# Patient Record
Sex: MALE | Race: WHITE | NOT HISPANIC OR LATINO | Employment: FULL TIME | URBAN - METROPOLITAN AREA
[De-identification: names, ages, dates, MRNs, and addresses within clinical notes are randomized per-mention and may not be internally consistent; named-entity substitution may affect disease eponyms.]

---

## 2020-07-25 ENCOUNTER — APPOINTMENT (OUTPATIENT)
Dept: RADIOLOGY | Facility: MEDICAL CENTER | Age: 55
End: 2020-07-25
Attending: EMERGENCY MEDICINE
Payer: COMMERCIAL

## 2020-07-25 ENCOUNTER — TELEPHONE (OUTPATIENT)
Dept: CARDIOLOGY | Facility: MEDICAL CENTER | Age: 55
End: 2020-07-25

## 2020-07-25 ENCOUNTER — HOSPITAL ENCOUNTER (EMERGENCY)
Facility: MEDICAL CENTER | Age: 55
End: 2020-07-25
Attending: EMERGENCY MEDICINE
Payer: COMMERCIAL

## 2020-07-25 VITALS
SYSTOLIC BLOOD PRESSURE: 125 MMHG | BODY MASS INDEX: 24.44 KG/M2 | HEART RATE: 53 BPM | DIASTOLIC BLOOD PRESSURE: 84 MMHG | RESPIRATION RATE: 18 BRPM | HEIGHT: 69 IN | WEIGHT: 165 LBS | OXYGEN SATURATION: 96 % | TEMPERATURE: 98 F

## 2020-07-25 DIAGNOSIS — I48.0 PAF (PAROXYSMAL ATRIAL FIBRILLATION) (HCC): Chronic | ICD-10-CM

## 2020-07-25 DIAGNOSIS — I48.0 PAROXYSMAL ATRIAL FIBRILLATION (HCC): ICD-10-CM

## 2020-07-25 LAB
ALBUMIN SERPL BCP-MCNC: 3.9 G/DL (ref 3.2–4.9)
ALBUMIN/GLOB SERPL: 1.8 G/DL
ALP SERPL-CCNC: 57 U/L (ref 30–99)
ALT SERPL-CCNC: 17 U/L (ref 2–50)
ANION GAP SERPL CALC-SCNC: 13 MMOL/L (ref 7–16)
AST SERPL-CCNC: 21 U/L (ref 12–45)
BASOPHILS # BLD AUTO: 0.7 % (ref 0–1.8)
BASOPHILS # BLD: 0.04 K/UL (ref 0–0.12)
BILIRUB SERPL-MCNC: 0.7 MG/DL (ref 0.1–1.5)
BUN SERPL-MCNC: 13 MG/DL (ref 8–22)
CALCIUM SERPL-MCNC: 9 MG/DL (ref 8.5–10.5)
CHLORIDE SERPL-SCNC: 104 MMOL/L (ref 96–112)
CO2 SERPL-SCNC: 21 MMOL/L (ref 20–33)
CREAT SERPL-MCNC: 1.1 MG/DL (ref 0.5–1.4)
D DIMER PPP IA.FEU-MCNC: <0.27 UG/ML (FEU) (ref 0–0.5)
EKG IMPRESSION: NORMAL
EKG IMPRESSION: NORMAL
EOSINOPHIL # BLD AUTO: 0.04 K/UL (ref 0–0.51)
EOSINOPHIL NFR BLD: 0.7 % (ref 0–6.9)
ERYTHROCYTE [DISTWIDTH] IN BLOOD BY AUTOMATED COUNT: 43.2 FL (ref 35.9–50)
GLOBULIN SER CALC-MCNC: 2.2 G/DL (ref 1.9–3.5)
GLUCOSE SERPL-MCNC: 67 MG/DL (ref 65–99)
HCT VFR BLD AUTO: 45.9 % (ref 42–52)
HGB BLD-MCNC: 15.1 G/DL (ref 14–18)
IMM GRANULOCYTES # BLD AUTO: 0.01 K/UL (ref 0–0.11)
IMM GRANULOCYTES NFR BLD AUTO: 0.2 % (ref 0–0.9)
LYMPHOCYTES # BLD AUTO: 1.59 K/UL (ref 1–4.8)
LYMPHOCYTES NFR BLD: 26.9 % (ref 22–41)
MAGNESIUM SERPL-MCNC: 1.8 MG/DL (ref 1.5–2.5)
MCH RBC QN AUTO: 31.6 PG (ref 27–33)
MCHC RBC AUTO-ENTMCNC: 32.9 G/DL (ref 33.7–35.3)
MCV RBC AUTO: 96 FL (ref 81.4–97.8)
MONOCYTES # BLD AUTO: 0.56 K/UL (ref 0–0.85)
MONOCYTES NFR BLD AUTO: 9.5 % (ref 0–13.4)
NEUTROPHILS # BLD AUTO: 3.67 K/UL (ref 1.82–7.42)
NEUTROPHILS NFR BLD: 62 % (ref 44–72)
NRBC # BLD AUTO: 0 K/UL
NRBC BLD-RTO: 0 /100 WBC
PLATELET # BLD AUTO: 216 K/UL (ref 164–446)
PMV BLD AUTO: 10.2 FL (ref 9–12.9)
POTASSIUM SERPL-SCNC: 3.9 MMOL/L (ref 3.6–5.5)
PROT SERPL-MCNC: 6.1 G/DL (ref 6–8.2)
RBC # BLD AUTO: 4.78 M/UL (ref 4.7–6.1)
SODIUM SERPL-SCNC: 138 MMOL/L (ref 135–145)
TROPONIN T SERPL-MCNC: 20 NG/L (ref 6–19)
WBC # BLD AUTO: 5.9 K/UL (ref 4.8–10.8)

## 2020-07-25 PROCEDURE — 80053 COMPREHEN METABOLIC PANEL: CPT

## 2020-07-25 PROCEDURE — 93005 ELECTROCARDIOGRAM TRACING: CPT | Performed by: EMERGENCY MEDICINE

## 2020-07-25 PROCEDURE — 85025 COMPLETE CBC W/AUTO DIFF WBC: CPT

## 2020-07-25 PROCEDURE — 83735 ASSAY OF MAGNESIUM: CPT

## 2020-07-25 PROCEDURE — 85379 FIBRIN DEGRADATION QUANT: CPT

## 2020-07-25 PROCEDURE — 93005 ELECTROCARDIOGRAM TRACING: CPT

## 2020-07-25 PROCEDURE — 71045 X-RAY EXAM CHEST 1 VIEW: CPT

## 2020-07-25 PROCEDURE — 99284 EMERGENCY DEPT VISIT MOD MDM: CPT | Performed by: INTERNAL MEDICINE

## 2020-07-25 PROCEDURE — 84484 ASSAY OF TROPONIN QUANT: CPT

## 2020-07-25 PROCEDURE — 99284 EMERGENCY DEPT VISIT MOD MDM: CPT

## 2020-07-25 RX ORDER — PROPAFENONE HYDROCHLORIDE 150 MG/1
150 TABLET, COATED ORAL ONCE
Status: DISCONTINUED | OUTPATIENT
Start: 2020-07-25 | End: 2020-07-25 | Stop reason: HOSPADM

## 2020-07-25 ASSESSMENT — LIFESTYLE VARIABLES
EVER HAD A DRINK FIRST THING IN THE MORNING TO STEADY YOUR NERVES TO GET RID OF A HANGOVER: NO
TOTAL SCORE: 0
DO YOU DRINK ALCOHOL: NO
ON A TYPICAL DAY WHEN YOU DRINK ALCOHOL HOW MANY DRINKS DO YOU HAVE: 0
AVERAGE NUMBER OF DAYS PER WEEK YOU HAVE A DRINK CONTAINING ALCOHOL: 0
HAVE PEOPLE ANNOYED YOU BY CRITICIZING YOUR DRINKING: NO
CONSUMPTION TOTAL: NEGATIVE
TOTAL SCORE: 0
EVER FELT BAD OR GUILTY ABOUT YOUR DRINKING: NO
HAVE YOU EVER FELT YOU SHOULD CUT DOWN ON YOUR DRINKING: NO
TOTAL SCORE: 0
HOW MANY TIMES IN THE PAST YEAR HAVE YOU HAD 5 OR MORE DRINKS IN A DAY: 0

## 2020-07-25 NOTE — ED NOTES
"Patient states that his \"chest feel different and does not have the feeling he had before\". Patient on monitor and HR in the 50's. ON monitor and ERP updated on patient/   "

## 2020-07-25 NOTE — ED PROVIDER NOTES
ED Provider Note    ED Provider Note    Primary care provider: Caitlin Sargent  Means of arrival: EMS  History obtained from: Patient    CHIEF COMPLAINT  Chief Complaint   Patient presents with   • Abnormal EKG     Seen at 12:47 PM.   HPI  Jose Manuel Aguirre is a 55 y.o. male who presents to the Emergency Department chest pain and shortness of breath.  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  He has been at work for the past 6 hours, initially he felt well but over the past few hours he noted increasing shortness of breath and a rapid heart rate.  He was unable to control the heart rate, he also began developing some left-sided chest discomfort and thus is transported in by EMS.  He denies any prior history of a tachyarrhythmia.  He does smoke cigarettes.  He does not take any medications.  Many years ago he had an episode of chest pain, he was evaluated and found to have no significant abnormalities.  He underwent a stress test at that time that was normal.    Prior to the events occurring today he was in his usual state of health.  He denies any recent fevers, chills, cough, numbness, weakness or bleeding diathesis.  No abdominal pain or back pain.    REVIEW OF SYSTEMS  See HPI,   Remainder of ROS negative.     PAST MEDICAL HISTORY   has a past medical history of Kidney stones and PAF (paroxysmal atrial fibrillation) (HCC).    SURGICAL HISTORY   has a past surgical history that includes other abdominal surgery and lap,surg,colectomy, partial, w/anast (1996).    SOCIAL HISTORY  Social History     Tobacco Use   • Smoking status: Former Smoker     Packs/day: 0.50     Years: 30.00     Pack years: 15.00     Types: Cigarettes   Substance Use Topics   • Alcohol use: No   • Drug use: No     Comment: cocaine, clean since 2001      Social History     Substance and Sexual Activity   Drug Use No    Comment: cocaine, clean since 2001       FAMILY HISTORY  No family history  "on file.    CURRENT MEDICATIONS  Reviewed.  See Encounter Summary.     ALLERGIES  No Known Allergies    PHYSICAL EXAM  VITAL SIGNS: /84   Pulse (!) 53   Temp 36.7 °C (98 °F) (Temporal)   Resp 18   Ht 1.753 m (5' 9\")   Wt 74.8 kg (165 lb)   SpO2 96%   BMI 24.37 kg/m²   Constitutional: Awake, alert in no apparent distress.  HENT: Normocephalic, Bilateral external ears normal. Nose normal.   Eyes: Conjunctiva normal, non-icteric, EOMI.    Thorax & Lungs: Easy unlabored respirations, Clear to ascultation bilaterally.  Cardiovascular: Regular rate, Regular rhythm, No murmurs, rubs or gallops. Bilateral pulses symmetrical.   Abdomen:  Soft, nontender, nondistended, normal active bowel sounds.   :    Skin: Visualized skin is  Dry, No erythema, No rash.   Musculoskeletal:   No cyanosis, clubbing or edema. No leg asymmetry.   Neurologic: Alert, Grossly non-focal.   Psychiatric: Normal affect, Normal mood  Lymphatic:  No cervical LAD    EKG   12 lead Interpreted by me  Rhythm: Narrow complex tachycardia  Rate: 150  Axis: normal  Ectopy: none  Conduction: A. fib  ST Segments: no acute change  T Waves: no acute change  Clinical Impression: A. fib with RVR    EKG   12 lead Interpreted by me  Rhythm: Sinus bradycardia  Rate: 57  Axis: normal  Ectopy: none  Conduction: normal  ST Segments: no acute change  T Waves: no acute change  Clinical Impression: Sinus bradycardia, flat ST segments throughout.    RADIOLOGY  DX-CHEST-PORTABLE (1 VIEW)   Final Result      1.  There is mild bronchial wall thickening in central interstitial opacity which could be present mild changes of edema.   2.  Cardiac silhouette is normal in size.            COURSE & MEDICAL DECISION MAKING  Pertinent Labs & Imaging studies reviewed. (See chart for details)    Differential diagnoses include but are not limited to: Pulmonary embolus, atypical chest pain, paroxysmal atrial fibrillation, ACS    12:47 PM - Medical record reviewed, patient seen " and examined at bedside.    1:27 PM-during the patient interview the patient had multiple runs of A. fib with return to a sinus bradycardia.  Borderline systolic blood pressure.  Cardiology paged for consultation.  D-dimer added for possibility of pulmonary embolus.    2:29 PM-case discussed with Dr. Mayfield, cardiology.  The patient has been evaluated.  The patient declines antiarrhythmics and anticoagulation at this time.  We discussed the rationale behind treatment and the patient continues to decline any medications.    Decision Making:  This is a 55 y.o. year old male who presents with paroxysmal atrial fibrillation with associated chest pain.  The patient was evaluated in the emergency department.  I recommended starting anticoagulation and an antiarrhythmic.  I discussed the case with cardiology who also evaluated the patient and recommended starting a anticoagulant and antiarrhythmic.  The patient apparently feels that this is not logical.  He also states that his brother had a stroke after discontinuing the anticoagulation, therefore he does not want to start the medication.    He was informed the risk of 5% of developing a CVA per year of untreated atrial fibrillation.  The patient cannot quite comprehend this but he is willing to follow-up.  With regards to chest pain standpoint, troponin is at 20 which is borderline elevation.  Cardiology did not feel any indication to repeat this.  The patient is not having active chest pain at this time.  He was given referrals for cardiology and specifically the electrophysiologist for follow-up.  The patient is willing to follow-up and was discharged at this time.  Of note because of his new onset atrial fibrillation I did order a d-dimer, this is negative, therefore the cause is not related to acute pulmonary embolism.  Heart score is moderate.    Discharge Medications:  There are no discharge medications for this patient.      The patient was discharged home (see  d/c instructions) was told to return immediately for any signs or symptoms listed, or any worsening at all.  The patient verbally agreed to the discharge precautions and follow-up plan which is documented in EPIC.    The patient's blood pressure is elevated today. >120/80. I have referred them to primary care for follow up.       FINAL IMPRESSION  1. Paroxysmal atrial fibrillation (HCC)

## 2020-07-25 NOTE — CONSULTS
Reason for Consult:  Asked by Dr Lance Bose M.D. to see this patient with paroxysmal atrial fibrillation  Patient's PCP: Caitlin Sargent    CC:   Chief Complaint   Patient presents with   • Abnormal EKG       HPI: This is a 55-year-old gentleman with a history of chest discomfort with normal ischemic work-up in the remote past as well as little bit more recently presents with palpitations chest discomfort sense of racing heart was found to have atrial fibrillation that was coming and going but quite fast in the ER over time his heart rate settled out.  He does not have any known trigger he is not having toxic habits.  He believes his current symptoms may have been similar to what his symptoms are back in 2012 which prompted heart evaluation then.  His brother had a stroke off of his blood thinners    Medications / Drug list prior to admission:  No current facility-administered medications on file prior to encounter.      No current outpatient medications on file prior to encounter.       Current list of administered Medications:    Current Facility-Administered Medications:   •  propafenone (RYTHMOL) tablet 150 mg, 150 mg, Oral, Once, Lance Bose M.D.  No current outpatient medications on file.    Past Medical History:   Diagnosis Date   • Kidney stones    • PAF (paroxysmal atrial fibrillation) (HCC)        Past Surgical History:   Procedure Laterality Date   • PB LAP,SURG,COLECTOMY, PARTIAL, W/ANAST  1996    d/t colitis   • OTHER ABDOMINAL SURGERY         Family History   Problem Relation Age of Onset   • Stroke Brother      Patient family history was personally reviewed, no pertinent family history to current presentation    Social History     Tobacco Use   • Smoking status: Former Smoker     Packs/day: 0.50     Years: 30.00     Pack years: 15.00     Types: Cigarettes   Substance Use Topics   • Alcohol use: No   • Drug use: No     Comment: cocaine, clean since 2001       ALLERGIES:  No Known  "Allergies    Review of systems:  A complete review of symptoms was reviewed with patient . This is reviewed in H&P and PMH. ALL OTHERS reviewed and negative    Physical exam:  Patient Vitals for the past 24 hrs:   BP Temp Temp src Pulse Resp SpO2 Height Weight   07/25/20 1421 117/82 -- -- (!) 54 16 96 % -- --   07/25/20 1351 -- -- -- (!) 50 -- 99 % -- --   07/25/20 1321 101/56 -- -- (!) 156 16 98 % -- --   07/25/20 1312 (!) 98/64 -- -- (!) 136 16 98 % -- --   07/25/20 1302 103/62 -- -- (!) 146 18 99 % -- --   07/25/20 1251 109/86 -- -- (!) 149 16 99 % -- --   07/25/20 1238 (!) 99/63 -- -- (!) 52 15 96 % -- --   07/25/20 1236 146/83 36.7 °C (98 °F) Temporal (!) 148 15 97 % -- --   07/25/20 1230 -- -- -- -- -- -- 1.753 m (5' 9\") 74.8 kg (165 lb)     General: No acute distress.   EYES: no jaundice  HEENT: OP clear   Neck: No bruits No JVD.   CVS: Bradycardic. S1 + S2. No M/R/G  Resp: CTAB. No wheezing or crackles/rhonchi.  Abdomen: Soft, NT, ND,  Skin: Grossly nothing acute no obvious rashes  Neurological: Alert, Moves all extremities, no cranial nerve defects on limited exam  Extremities: Pulse 2+ in b/l LE.  No edema. No cyanosis.       Data:  Laboratory studies personally reviewed by me:  Recent Results (from the past 24 hour(s))   CBC w/ Differential    Collection Time: 07/25/20 12:30 PM   Result Value Ref Range    WBC 5.9 4.8 - 10.8 K/uL    RBC 4.78 4.70 - 6.10 M/uL    Hemoglobin 15.1 14.0 - 18.0 g/dL    Hematocrit 45.9 42.0 - 52.0 %    MCV 96.0 81.4 - 97.8 fL    MCH 31.6 27.0 - 33.0 pg    MCHC 32.9 (L) 33.7 - 35.3 g/dL    RDW 43.2 35.9 - 50.0 fL    Platelet Count 216 164 - 446 K/uL    MPV 10.2 9.0 - 12.9 fL    Neutrophils-Polys 62.00 44.00 - 72.00 %    Lymphocytes 26.90 22.00 - 41.00 %    Monocytes 9.50 0.00 - 13.40 %    Eosinophils 0.70 0.00 - 6.90 %    Basophils 0.70 0.00 - 1.80 %    Immature Granulocytes 0.20 0.00 - 0.90 %    Nucleated RBC 0.00 /100 WBC    Neutrophils (Absolute) 3.67 1.82 - 7.42 K/uL    Lymphs " (Absolute) 1.59 1.00 - 4.80 K/uL    Monos (Absolute) 0.56 0.00 - 0.85 K/uL    Eos (Absolute) 0.04 0.00 - 0.51 K/uL    Baso (Absolute) 0.04 0.00 - 0.12 K/uL    Immature Granulocytes (abs) 0.01 0.00 - 0.11 K/uL    NRBC (Absolute) 0.00 K/uL   Complete Metabolic Panel (CMP)    Collection Time: 20 12:30 PM   Result Value Ref Range    Sodium 138 135 - 145 mmol/L    Potassium 3.9 3.6 - 5.5 mmol/L    Chloride 104 96 - 112 mmol/L    Co2 21 20 - 33 mmol/L    Anion Gap 13.0 7.0 - 16.0    Glucose 67 65 - 99 mg/dL    Bun 13 8 - 22 mg/dL    Creatinine 1.10 0.50 - 1.40 mg/dL    Calcium 9.0 8.5 - 10.5 mg/dL    AST(SGOT) 21 12 - 45 U/L    ALT(SGPT) 17 2 - 50 U/L    Alkaline Phosphatase 57 30 - 99 U/L    Total Bilirubin 0.7 0.1 - 1.5 mg/dL    Albumin 3.9 3.2 - 4.9 g/dL    Total Protein 6.1 6.0 - 8.2 g/dL    Globulin 2.2 1.9 - 3.5 g/dL    A-G Ratio 1.8 g/dL   Troponin STAT    Collection Time: 20 12:30 PM   Result Value Ref Range    Troponin T 20 (H) 6 - 19 ng/L   Magnesium    Collection Time: 20 12:30 PM   Result Value Ref Range    Magnesium 1.8 1.5 - 2.5 mg/dL   D-DIMER    Collection Time: 20 12:30 PM   Result Value Ref Range    D-Dimer Screen <0.27 0.00 - 0.50 ug/mL (FEU)   ESTIMATED GFR    Collection Time: 20 12:30 PM   Result Value Ref Range    GFR If African American >60 >60 mL/min/1.73 m 2    GFR If Non African American >60 >60 mL/min/1.73 m 2   EKG    Collection Time: 20 12:36 PM   Result Value Ref Range    Report       [facility] Emergency Dept.    Test Date:  2020  Pt Name:    CATHIE GODINEZ            Department: ER  MRN:        [id number]                      Room:       [facility]  Gender:     Male                         Technician: [id number]  :        1965                   Requested By:ER TRIAGE PROTOCOL  Order #:    [id number]                    Reading MD:    Measurements  Intervals                                Axis  Rate:       57                            P:          74  CO:         140                          QRS:        62  QRSD:       76                           T:          249  QT:         364  QTc:        355    Interpretive Statements  SINUS BRADYCARDIA  LEFT ATRIAL ABNORMALITY  RSR' IN V1 OR V2, PROBABLY NORMAL VARIANT  NONSPECIFIC REPOL ABNORMALITY, INFERIOR LEADS  BASELINE WANDER IN LEAD(S) V6  Compared to ECG 2012 18:16:33  Atrial abnormality now present  RSR' in V1 or V2 now present  Sinus rhythm no longer present     EKG    Collection Time: 20 12:38 PM   Result Value Ref Range    Report       [facility] Emergency Dept.    Test Date:  2020  Pt Name:    CATHIE GODINEZ            Department: ER  MRN:        [id number]                      Room:       [facility]  Gender:     Male                         Technician: [id number]  :        1965                   Requested By:ER TRIAGE PROTOCOL  Order #:    [id number]                    Reading MD:    Measurements  Intervals                                Axis  Rate:       150                          P:  CO:                                      QRS:        66  QRSD:       68                           T:          253  QT:         280  QTc:        443    Interpretive Statements  ATRIAL FIBRILLATION  ABNORMAL T, CONSIDER ISCHEMIA, DIFFUSE LEADS  Compared to ECG 2020 12:36:50  T-wave abnormality now present  Possible ischemia now present  Sinus bradycardia no longer present  Atrial abnormality no longer present  Early repolarization no longer present         Imaging:  DX-CHEST-PORTABLE (1 VIEW)   Final Result      1.  There is mild bronchial wall thickening in central interstitial opacity which could be present mild changes of edema.   2.  Cardiac silhouette is normal in size.              EKG : personally reviewed by stephany kelley telemetry now shows sinus bradycardia    Prior stress test was normal    All pertinent features of laboratory and imaging reviewed including  primary images where applicable        Assessment / Plan:  Paroxysmal atrial fibrillation we discussed the available treatment options he would be a good candidate for propafenone for suppressive therapy although given this was is first occurrence and was short-lived have a diagnosis for his symptoms does not absolutely necessary we also discussed given his minimal medical history he is at low risk for stroke but it is not unreasonable to take blood thinners in the first couple of months.  He understands risks and benefits of that conversation declines to take both we will arrange for an outpatient echocardiogram and an outpatient EP evaluation but he can be safely discharged from the emergency room was urged to call us should he have recurrence of symptoms        I personally discussed his case with  Dr Lance Bose M.D.    No future appointments.    It is my pleasure to participate in the care of Mr. Aguirre.  Please do not hesitate to contact me with questions or concerns.    Kishore Mayfield MD PhD [facility]  Cardiologist [facility]    7/25/2020    Please note that this dictation was created using voice recognition software. There may be errors I did not discover before finalizing the note.

## 2020-07-25 NOTE — ED TRIAGE NOTES
Pt presents via ems with irregular heart rhythm. Pt will go from bradycardia, tachycardia, svt, and fib intermittently. Pt bp increases and decreases with heart rate changes. Pt had diaphoresis and n/v when it first started. Pt is alert pwd, pads are on pt. Has significant family heart hx.   Past Medical History:   Diagnosis Date   • Kidney stones

## 2020-07-28 NOTE — TELEPHONE ENCOUNTER
Kishore Mayfield M.D.  You 5 days ago       Make sure he gets an echo and offered EP appt    Routing comment            [employer] message sent 07/28/20. Will attempt to call pt if message is not read by the end of the week.

## 2020-07-30 NOTE — TELEPHONE ENCOUNTER
Pt read [employer] message, it appears that pt has scheduled his echo for 8/10 and switched his appt w/ JS to  on 8/19.

## 2020-07-31 ENCOUNTER — TELEPHONE (OUTPATIENT)
Dept: CARDIOLOGY | Facility: MEDICAL CENTER | Age: 55
End: 2020-07-31

## 2020-07-31 NOTE — TELEPHONE ENCOUNTER
Mayra    Pt calling with questions, relative to your MyChart email of 7/28.   Pt wants to have a return to work release.    Employer H/R person told pt to call to obtain such letter.      Please either email it to him or put it in his MyChart and kindly leave a v/m as well.     Please call Jose Manuel, [phone]

## 2020-07-31 NOTE — TELEPHONE ENCOUNTER
Pt returning call. Pt is upset he cant get through to you bc he's working but I advised he can also send a MyChart msg if it's easier. Pt disconnected call

## 2020-07-31 NOTE — TELEPHONE ENCOUNTER
LVM asking pt to call back so I can obtain more information in regards to his return to work request.

## 2020-08-10 ENCOUNTER — HOSPITAL ENCOUNTER (OUTPATIENT)
Dept: CARDIOLOGY | Facility: MEDICAL CENTER | Age: 55
End: 2020-08-10
Attending: INTERNAL MEDICINE
Payer: COMMERCIAL

## 2020-08-10 DIAGNOSIS — I48.0 PAF (PAROXYSMAL ATRIAL FIBRILLATION) (HCC): Chronic | ICD-10-CM

## 2020-08-10 LAB
LV EJECT FRACT MOD 2C 99903: 69.34
LV EJECT FRACT MOD 4C 99902: 70.69
LV EJECT FRACT MOD BP 99901: 70.55

## 2020-08-10 PROCEDURE — 93306 TTE W/DOPPLER COMPLETE: CPT | Mod: 26 | Performed by: INTERNAL MEDICINE

## 2020-08-10 PROCEDURE — 93306 TTE W/DOPPLER COMPLETE: CPT

## 2020-08-11 ENCOUNTER — TELEPHONE (OUTPATIENT)
Dept: CARDIOLOGY | Facility: MEDICAL CENTER | Age: 55
End: 2020-08-11

## 2020-08-11 NOTE — TELEPHONE ENCOUNTER
Kishore Mayfield M.D.  Mandi Jernigan R.N.             The echo looks good, please let him know        See [employer] message 08/11/20

## 2020-08-19 ENCOUNTER — OFFICE VISIT (OUTPATIENT)
Dept: CARDIOLOGY | Facility: MEDICAL CENTER | Age: 55
End: 2020-08-19
Payer: COMMERCIAL

## 2020-08-19 VITALS
SYSTOLIC BLOOD PRESSURE: 118 MMHG | OXYGEN SATURATION: 95 % | BODY MASS INDEX: 26.07 KG/M2 | HEART RATE: 46 BPM | WEIGHT: 176 LBS | HEIGHT: 69 IN | DIASTOLIC BLOOD PRESSURE: 72 MMHG

## 2020-08-19 DIAGNOSIS — I48.0 PAF (PAROXYSMAL ATRIAL FIBRILLATION) (HCC): ICD-10-CM

## 2020-08-19 LAB — EKG IMPRESSION: NORMAL

## 2020-08-19 PROCEDURE — 99204 OFFICE O/P NEW MOD 45 MIN: CPT | Performed by: INTERNAL MEDICINE

## 2020-08-19 PROCEDURE — 93000 ELECTROCARDIOGRAM COMPLETE: CPT | Performed by: INTERNAL MEDICINE

## 2020-08-19 ASSESSMENT — FIBROSIS 4 INDEX: FIB4 SCORE: 1.34

## 2020-08-19 NOTE — PROGRESS NOTES
Arrhythmia Clinic Note (New patient)     DOS: 8/19/2020    Referring physician: Dr Mayfield    Chief complaint/Reason for consult: Palpitations    HPI: 55-year-old man with no prior past medical history presents to the ER last week with acute onset of rapid palpitations.  He said he felt like his whole body was vibrating.  He felt intermittent chest pressure, intermittent dizziness.  No shortness of breath necessarily.  When he presented to the emergency room, he was noted to be in atrial fibrillation with rapid ventricular response.  On observation on telemetry, he eventually spontaneously converted to sinus bradycardia.  L episode lasted a few hours.  He has had no recurrence since.  He does note an episode that was similar to this that occurred 8 years ago, but had resolved by the time he went to the emergency room.  He does not take any medications for this.  Both instances, he thinks he was very stressed when the episode onset occurred.  He thinks stress has a lot to do with it.    ROS (+ highlighted in bold):  Constitutional: Fevers/chills/fatigue/weightloss  HEENT: Blurry vision/eye pain/sore throat/hearing loss  Respiratory: Shortness of breath/cough  Cardiovascular: Chest pain/palpitations/edema/orthopnea/syncope  GI: Nausea/vomitting/diarrhea  MSK: Arthralgias/myagias/muscle weakness  Skin: Rash/sores  Neurological: Numbness/tremors/vertigo  Endocrine: Excessive thirst/polyuria/cold intolerance/heat intolerance  Psych: Depression/anxiety    Past Medical History:   Diagnosis Date   • Kidney stones    • PAF (paroxysmal atrial fibrillation) (HCC)        Past Surgical History:   Procedure Laterality Date   • PB LAP,SURG,COLECTOMY, PARTIAL, W/ANAST  1996    d/t colitis   • OTHER ABDOMINAL SURGERY         Social History     Socioeconomic History   • Marital status: Single     Spouse name: Not on file   • Number of children: Not on file   • Years of education: Not on file   • Highest education level: Not on file  "  Occupational History   • Not on file   Social Needs   • Financial resource strain: Not on file   • Food insecurity     Worry: Not on file     Inability: Not on file   • Transportation needs     Medical: Not on file     Non-medical: Not on file   Tobacco Use   • Smoking status: Former Smoker     Packs/day: 0.50     Years: 30.00     Pack years: 15.00     Types: Cigarettes   • Smokeless tobacco: Never Used   Substance and Sexual Activity   • Alcohol use: No   • Drug use: No     Comment: cocaine, clean since 2001   • Sexual activity: Not on file   Lifestyle   • Physical activity     Days per week: Not on file     Minutes per session: Not on file   • Stress: Not on file   Relationships   • Social connections     Talks on phone: Not on file     Gets together: Not on file     Attends Restorationism service: Not on file     Active member of club or organization: Not on file     Attends meetings of clubs or organizations: Not on file     Relationship status: Not on file   • Intimate partner violence     Fear of current or ex partner: Not on file     Emotionally abused: Not on file     Physically abused: Not on file     Forced sexual activity: Not on file   Other Topics Concern   • Not on file   Social History Narrative   • Not on file       Family History   Problem Relation Age of Onset   • Stroke Brother    Strong family history of atrial fibrillation and multiple family members    No Known Allergies    No current outpatient medications on file.     No current facility-administered medications for this visit.        Physical Exam:  Vitals:    08/19/20 0823   BP: 118/72   BP Location: Left arm   Patient Position: Sitting   BP Cuff Size: Adult   Pulse: (!) 46   SpO2: 95%   Weight: 79.8 kg (176 lb)   Height: 1.753 m (5' 9\")     General appearance: NAD, conversant   Eyes: anicteric sclerae, moist conjunctivae; no lid-lag; PERRLA  HENT: Atraumatic; oropharynx clear with moist mucous membranes and no mucosal ulcerations; normal hard " and soft palate  Neck: Trachea midline; FROM, supple, no thyromegaly or lymphadenopathy  Lungs: CTA, with normal respiratory effort and no intercostal retractions  CV: RRR, no MRGs, no JVD   Abdomen: Soft, non-tender; no masses or HSM  Extremities: No peripheral edema or extremity lymphadenopathy  Skin: Normal temperature, turgor and texture; no rash, ulcers or subcutaneous nodules  Psych: Appropriate affect, alert and oriented to person, place and time    Data:  Lipids:   Lab Results   Component Value Date/Time    CHOLSTRLTOT 209 (H) 04/06/2012 03:15 AM    TRIGLYCERIDE 187 (H) 04/06/2012 03:15 AM    HDL 29 (A) 04/06/2012 03:15 AM     04/06/2012 03:15 AM        BMP:  Lab Results   Component Value Date/Time    SODIUM 138 07/25/2020 1230    POTASSIUM 3.9 07/25/2020 1230    CHLORIDE 104 07/25/2020 1230    CO2 21 07/25/2020 1230    GLUCOSE 67 07/25/2020 1230    BUN 13 07/25/2020 1230    CREATININE 1.10 07/25/2020 1230    CALCIUM 9.0 07/25/2020 1230    ANION 13.0 07/25/2020 1230        TSH:   No results found for: TSHULTRASEN     THYROXINE (T4):   No results found for: FREEDIR     CBC:   Lab Results   Component Value Date/Time    WBC 5.9 07/25/2020 12:30 PM    RBC 4.78 07/25/2020 12:30 PM    HEMOGLOBIN 15.1 07/25/2020 12:30 PM    HEMATOCRIT 45.9 07/25/2020 12:30 PM    MCV 96.0 07/25/2020 12:30 PM    MCH 31.6 07/25/2020 12:30 PM    MCHC 32.9 (L) 07/25/2020 12:30 PM    RDW 43.2 07/25/2020 12:30 PM    PLATELETCT 216 07/25/2020 12:30 PM    MPV 10.2 07/25/2020 12:30 PM    NEUTSPOLYS 62.00 07/25/2020 12:30 PM    LYMPHOCYTES 26.90 07/25/2020 12:30 PM    MONOCYTES 9.50 07/25/2020 12:30 PM    EOSINOPHILS 0.70 07/25/2020 12:30 PM    BASOPHILS 0.70 07/25/2020 12:30 PM    IMMGRAN 0.20 07/25/2020 12:30 PM    NRBC 0.00 07/25/2020 12:30 PM    NEUTS 3.67 07/25/2020 12:30 PM    LYMPHS 1.59 07/25/2020 12:30 PM    MONOS 0.56 07/25/2020 12:30 PM    EOS 0.04 07/25/2020 12:30 PM    BASO 0.04 07/25/2020 12:30 PM    IMMGRANAB 0.01  07/25/2020 12:30 PM    NRBCAB 0.00 07/25/2020 12:30 PM        CBC w/o DIFF  Lab Results   Component Value Date/Time    WBC 5.9 07/25/2020 12:30 PM    RBC 4.78 07/25/2020 12:30 PM    HEMOGLOBIN 15.1 07/25/2020 12:30 PM    MCV 96.0 07/25/2020 12:30 PM    MCH 31.6 07/25/2020 12:30 PM    MCHC 32.9 (L) 07/25/2020 12:30 PM    RDW 43.2 07/25/2020 12:30 PM    MPV 10.2 07/25/2020 12:30 PM       Prior echo/stress results reviewed: Echocardiogram shows normal systolic function    EKG interpreted by me: Sinus bradycardia    Impression/Plan:  1. PAF (paroxysmal atrial fibrillation) ([facility])  EKG     1.  Paroxysmal atrial fibrillation with RVR    -We discussed options for treatment.  We discussed rate control, rhythm control.  We did discuss anticoagulation.  His risk score is 0, I recommend aspirin 81 mg daily  -He would like to start no medications at this time, but if he has recurrence, he would be interested in rhythm control.  -We discussed antiarrhythmic medications and PVI ablation for treatment.  Is sister had an atrial fibrillation ablation and his father was on medications for atrial fibrillation.  He would be willing to consider either of these if he has recurrence.    Follow-up in 6 months    George Souza MD  Cardiac Electrophysiology

## 2021-02-14 ENCOUNTER — HOSPITAL ENCOUNTER (OUTPATIENT)
Facility: MEDICAL CENTER | Age: 56
End: 2021-02-14
Attending: FAMILY MEDICINE
Payer: COMMERCIAL

## 2021-02-14 ENCOUNTER — OFFICE VISIT (OUTPATIENT)
Dept: URGENT CARE | Facility: PHYSICIAN GROUP | Age: 56
End: 2021-02-14
Payer: COMMERCIAL

## 2021-02-14 VITALS
SYSTOLIC BLOOD PRESSURE: 106 MMHG | OXYGEN SATURATION: 95 % | HEART RATE: 69 BPM | HEIGHT: 69 IN | BODY MASS INDEX: 25.62 KG/M2 | RESPIRATION RATE: 14 BRPM | WEIGHT: 173 LBS | TEMPERATURE: 96.6 F | DIASTOLIC BLOOD PRESSURE: 62 MMHG

## 2021-02-14 DIAGNOSIS — R21 RASH: ICD-10-CM

## 2021-02-14 PROCEDURE — 87102 FUNGUS ISOLATION CULTURE: CPT

## 2021-02-14 PROCEDURE — 87070 CULTURE OTHR SPECIMN AEROBIC: CPT

## 2021-02-14 PROCEDURE — 87205 SMEAR GRAM STAIN: CPT

## 2021-02-14 PROCEDURE — 87075 CULTR BACTERIA EXCEPT BLOOD: CPT

## 2021-02-14 PROCEDURE — 99204 OFFICE O/P NEW MOD 45 MIN: CPT | Performed by: FAMILY MEDICINE

## 2021-02-14 RX ORDER — CLOTRIMAZOLE AND BETAMETHASONE DIPROPIONATE 10; .64 MG/G; MG/G
1 CREAM TOPICAL 2 TIMES DAILY
Qty: 1 G | Refills: 0 | Status: SHIPPED | OUTPATIENT
Start: 2021-02-14 | End: 2022-08-25

## 2021-02-14 ASSESSMENT — FIBROSIS 4 INDEX: FIB4 SCORE: 1.34

## 2021-02-15 LAB
GRAM STN SPEC: NORMAL
SIGNIFICANT IND 70042: NORMAL
SITE SITE: NORMAL
SOURCE SOURCE: NORMAL

## 2021-02-16 LAB
BACTERIA WND AEROBE CULT: NORMAL
GRAM STN SPEC: NORMAL
SIGNIFICANT IND 70042: NORMAL
SITE SITE: NORMAL
SOURCE SOURCE: NORMAL

## 2021-02-16 NOTE — PROGRESS NOTES
Chief Complaint   Patient presents with   • Rash     on back of [id number]      Subjective:      Chief Complaint   Patient presents with   • Rash     on back of [id number]                  Rash  This is a new problem. The current episode started in the past 2-3 wks. The problem is unchanged. The rash is on the back of his neck. The rash is characterized by redness,   itchiness. Pt was exposed to nothing. Pertinent negatives include no cough, fatigue, fever, shortness of breath or sore throat. Past treatments include : OTC antifungal - no improvement.       Past Medical History:   Diagnosis Date   • Kidney stones    • PAF (paroxysmal atrial fibrillation) ([facility])          Social History     Tobacco Use   • Smoking status: Current Every Day Smoker     Packs/day: 0.50     Years: 30.00     Pack years: 15.00     Types: Cigarettes   • Smokeless tobacco: Never Used   Substance Use Topics   • Alcohol use: No   • Drug use: No     Comment: cocaine, clean since 2001              No current outpatient medications on file prior to visit.     No current facility-administered medications on file prior to visit.       Current Outpatient Prescriptions on File Prior to Visit            Review of Systems   Constitutional: Negative for fever, chills and weight loss.   HENT - denies cough, ear pain, congestion, sore throat  Eyes: denies vision changes, discharge  Respiratory: Negative for cough and wheezing.    Cardiovascular: Negative for chest pain or PND.   Gastrointestinal:  No abdominal pain,  nausea, vomiting, diarrhea.  Negative for  blood in stool.    - no discharge, dysuria, frequency.      Neurological: Negative for dizziness and headaches.   musculoskeletal - denies myalgias, calf pain  Psych - denies anxiety/depression/mood changes.  Skin: +  itching  , rash  All other systems reviewed and are negative.       Objective:   Pulse 72, temperature 36.2 °C (97.2 °F), weight 82.6 kg (182 lb), SpO2 98 %.    Physical Exam    Constitutional: pt is oriented to person, place, and time. Pt appears well-developed and well-nourished. No distress.   HENT:   Head: Normocephalic and atraumatic.   Mouth/Throat: Oropharynx is clear and moist and mucous membranes are normal. No uvula swelling. No oropharyngeal exudate, posterior oropharyngeal edema or posterior oropharyngeal erythema.   Eyes: Conjunctivae are normal.   Cardiovascular: Normal rate, regular rhythm and normal heart sounds.    Pulmonary/Chest: Effort normal and breath sounds normal. No respiratory distress. She has no wheezes.   Neurological: pt is alert and oriented to person, place, and time. No cranial nerve deficit.   Skin: Rash (one ovoid, scaly, erythematous plaque on back of neck ) noted. Pt is not diaphoretic. There is erythema.   Psychiatric: pt's behavior is normal.   Nursing note and vitals reviewed.              Assessment/Plan:     1. Rash     Suspicious for fungal infection    Trial of lotrisone  Cultures taken - will call w/results    - clotrimazole-betamethasone (LOTRISONE) 1-0.05 % Cream; Apply 1 Application topically 2 times a day.  Dispense: 1 g; Refill: 0  - ANAEROBIC/AEROBIC/GRAM STAIN  - Fungal Culture; Future    Follow up in one week if no improvement, sooner if symptoms worsen.

## 2021-02-17 LAB
BACTERIA SPEC ANAEROBE CULT: NORMAL
SIGNIFICANT IND 70042: NORMAL
SITE SITE: NORMAL
SOURCE SOURCE: NORMAL

## 2021-03-15 DIAGNOSIS — Z23 NEED FOR VACCINATION: ICD-10-CM

## 2021-03-22 LAB
FUNGUS SPEC CULT: NORMAL
SIGNIFICANT IND 70042: NORMAL
SITE SITE: NORMAL
SOURCE SOURCE: NORMAL

## 2021-08-12 ENCOUNTER — HOSPITAL ENCOUNTER (EMERGENCY)
Dept: HOSPITAL 8 - ED | Age: 56
Discharge: HOME | End: 2021-08-12
Payer: SELF-PAY

## 2021-08-12 VITALS — DIASTOLIC BLOOD PRESSURE: 87 MMHG | SYSTOLIC BLOOD PRESSURE: 143 MMHG

## 2021-08-12 VITALS — WEIGHT: 174.17 LBS | HEIGHT: 69 IN | BODY MASS INDEX: 25.8 KG/M2

## 2021-08-12 DIAGNOSIS — R42: ICD-10-CM

## 2021-08-12 DIAGNOSIS — R00.1: ICD-10-CM

## 2021-08-12 DIAGNOSIS — R00.2: Primary | ICD-10-CM

## 2021-08-12 DIAGNOSIS — F17.200: ICD-10-CM

## 2021-08-12 LAB
ALBUMIN SERPL-MCNC: 3.4 G/DL (ref 3.4–5)
ALP SERPL-CCNC: 62 U/L (ref 45–117)
ALT SERPL-CCNC: 19 U/L (ref 12–78)
ANION GAP SERPL CALC-SCNC: 8 MMOL/L (ref 5–15)
BASOPHILS # BLD AUTO: 0 X10^3/UL (ref 0–0.1)
BASOPHILS NFR BLD AUTO: 0 % (ref 0–1)
BILIRUB SERPL-MCNC: 0.8 MG/DL (ref 0.2–1)
CALCIUM SERPL-MCNC: 9.3 MG/DL (ref 8.5–10.1)
CHLORIDE SERPL-SCNC: 108 MMOL/L (ref 98–107)
CREAT SERPL-MCNC: 0.89 MG/DL (ref 0.7–1.3)
EOSINOPHIL # BLD AUTO: 0 X10^3/UL (ref 0–0.4)
EOSINOPHIL NFR BLD AUTO: 1 % (ref 1–7)
ERYTHROCYTE [DISTWIDTH] IN BLOOD BY AUTOMATED COUNT: 12.9 % (ref 9.4–14.8)
LYMPHOCYTES # BLD AUTO: 1.5 X10^3/UL (ref 1–3.4)
LYMPHOCYTES NFR BLD AUTO: 33 % (ref 22–44)
MCH RBC QN AUTO: 32 PG (ref 27.5–34.5)
MCHC RBC AUTO-ENTMCNC: 34.3 G/DL (ref 33.2–36.2)
MONOCYTES # BLD AUTO: 0.4 X10^3/UL (ref 0.2–0.8)
MONOCYTES NFR BLD AUTO: 9 % (ref 2–9)
NEUTROPHILS # BLD AUTO: 2.6 X10^3/UL (ref 1.8–6.8)
NEUTROPHILS NFR BLD AUTO: 57 % (ref 42–75)
PLATELET # BLD AUTO: 205 X10^3/UL (ref 130–400)
PMV BLD AUTO: 8.4 FL (ref 7.4–10.4)
PROT SERPL-MCNC: 6.8 G/DL (ref 6.4–8.2)
RBC # BLD AUTO: 4.85 X10^6/UL (ref 4.38–5.82)
TROPONIN I SERPL-MCNC: < 0.015 NG/ML (ref 0–0.04)

## 2021-08-12 PROCEDURE — 84484 ASSAY OF TROPONIN QUANT: CPT

## 2021-08-12 PROCEDURE — 80053 COMPREHEN METABOLIC PANEL: CPT

## 2021-08-12 PROCEDURE — 85025 COMPLETE CBC W/AUTO DIFF WBC: CPT

## 2021-08-12 PROCEDURE — 71045 X-RAY EXAM CHEST 1 VIEW: CPT

## 2021-08-12 PROCEDURE — 93005 ELECTROCARDIOGRAM TRACING: CPT

## 2021-08-12 PROCEDURE — 36415 COLL VENOUS BLD VENIPUNCTURE: CPT

## 2021-08-12 PROCEDURE — 99285 EMERGENCY DEPT VISIT HI MDM: CPT

## 2021-08-12 NOTE — NUR
Pt to room from ambulance wall. Pt c/o palpitations and dizziness since 0900 
this morning. Pt reports dizziness improved while lying down and resting. Pt 
denies pain or other complaint. Pt placed in gown, positioned for comfort in 
bed with blanket. EKG completed, continuous heart, oxygen and BP monitors 
applied, all safety measures observed.

## 2022-08-25 ENCOUNTER — HOSPITAL ENCOUNTER (INPATIENT)
Facility: MEDICAL CENTER | Age: 57
LOS: 1 days | DRG: 661 | End: 2022-08-26
Attending: EMERGENCY MEDICINE | Admitting: HOSPITALIST
Payer: COMMERCIAL

## 2022-08-25 ENCOUNTER — APPOINTMENT (OUTPATIENT)
Dept: RADIOLOGY | Facility: MEDICAL CENTER | Age: 57
DRG: 661 | End: 2022-08-25
Attending: UROLOGY
Payer: COMMERCIAL

## 2022-08-25 ENCOUNTER — APPOINTMENT (OUTPATIENT)
Dept: RADIOLOGY | Facility: MEDICAL CENTER | Age: 57
DRG: 661 | End: 2022-08-25
Attending: EMERGENCY MEDICINE
Payer: COMMERCIAL

## 2022-08-25 ENCOUNTER — ANESTHESIA EVENT (OUTPATIENT)
Dept: SURGERY | Facility: MEDICAL CENTER | Age: 57
DRG: 661 | End: 2022-08-25
Payer: COMMERCIAL

## 2022-08-25 ENCOUNTER — ANESTHESIA (OUTPATIENT)
Dept: SURGERY | Facility: MEDICAL CENTER | Age: 57
DRG: 661 | End: 2022-08-25
Payer: COMMERCIAL

## 2022-08-25 DIAGNOSIS — R10.9 FLANK PAIN: ICD-10-CM

## 2022-08-25 DIAGNOSIS — N20.1 RIGHT URETERAL STONE: ICD-10-CM

## 2022-08-25 DIAGNOSIS — N20.1 URETEROLITHIASIS: ICD-10-CM

## 2022-08-25 LAB
ANION GAP SERPL CALC-SCNC: 9 MMOL/L (ref 7–16)
APPEARANCE UR: CLEAR
BACTERIA #/AREA URNS HPF: ABNORMAL /HPF
BILIRUB UR QL STRIP.AUTO: NEGATIVE
BUN SERPL-MCNC: 10 MG/DL (ref 8–22)
CALCIUM SERPL-MCNC: 9.1 MG/DL (ref 8.4–10.2)
CHLORIDE SERPL-SCNC: 105 MMOL/L (ref 96–112)
CO2 SERPL-SCNC: 25 MMOL/L (ref 20–33)
COLOR UR: ABNORMAL
CREAT SERPL-MCNC: 0.78 MG/DL (ref 0.5–1.4)
ERYTHROCYTE [DISTWIDTH] IN BLOOD BY AUTOMATED COUNT: 44.5 FL (ref 35.9–50)
GFR SERPLBLD CREATININE-BSD FMLA CKD-EPI: 102 ML/MIN/1.73 M 2
GLUCOSE SERPL-MCNC: 71 MG/DL (ref 65–99)
GLUCOSE UR STRIP.AUTO-MCNC: NEGATIVE MG/DL
HCT VFR BLD AUTO: 45.1 % (ref 42–52)
HGB BLD-MCNC: 15.1 G/DL (ref 14–18)
KETONES UR STRIP.AUTO-MCNC: NEGATIVE MG/DL
LEUKOCYTE ESTERASE UR QL STRIP.AUTO: ABNORMAL
MCH RBC QN AUTO: 32.1 PG (ref 27–33)
MCHC RBC AUTO-ENTMCNC: 33.5 G/DL (ref 33.7–35.3)
MCV RBC AUTO: 96 FL (ref 81.4–97.8)
MICRO URNS: ABNORMAL
NITRITE UR QL STRIP.AUTO: NEGATIVE
PATHOLOGY CONSULT NOTE: NORMAL
PH UR STRIP.AUTO: 6 [PH] (ref 5–8)
PLATELET # BLD AUTO: 170 K/UL (ref 164–446)
PMV BLD AUTO: 10.6 FL (ref 9–12.9)
POTASSIUM SERPL-SCNC: 4.2 MMOL/L (ref 3.6–5.5)
PROT UR QL STRIP: NEGATIVE MG/DL
RBC # BLD AUTO: 4.7 M/UL (ref 4.7–6.1)
RBC # URNS HPF: ABNORMAL /HPF
RBC UR QL AUTO: ABNORMAL
SODIUM SERPL-SCNC: 139 MMOL/L (ref 135–145)
SP GR UR STRIP.AUTO: <=1.005
UNIDENT CRYS URNS QL MICRO: ABNORMAL /HPF
WBC # BLD AUTO: 5.4 K/UL (ref 4.8–10.8)
WBC #/AREA URNS HPF: ABNORMAL /HPF

## 2022-08-25 PROCEDURE — 160028 HCHG SURGERY MINUTES - 1ST 30 MINS LEVEL 3: Performed by: UROLOGY

## 2022-08-25 PROCEDURE — 160048 HCHG OR STATISTICAL LEVEL 1-5: Performed by: UROLOGY

## 2022-08-25 PROCEDURE — 700101 HCHG RX REV CODE 250: Performed by: UROLOGY

## 2022-08-25 PROCEDURE — 82365 CALCULUS SPECTROSCOPY: CPT

## 2022-08-25 PROCEDURE — 160002 HCHG RECOVERY MINUTES (STAT): Performed by: UROLOGY

## 2022-08-25 PROCEDURE — 85027 COMPLETE CBC AUTOMATED: CPT

## 2022-08-25 PROCEDURE — 74176 CT ABD & PELVIS W/O CONTRAST: CPT

## 2022-08-25 PROCEDURE — 160039 HCHG SURGERY MINUTES - EA ADDL 1 MIN LEVEL 3: Performed by: UROLOGY

## 2022-08-25 PROCEDURE — A9270 NON-COVERED ITEM OR SERVICE: HCPCS | Performed by: UROLOGY

## 2022-08-25 PROCEDURE — 160035 HCHG PACU - 1ST 60 MINS PHASE I: Performed by: UROLOGY

## 2022-08-25 PROCEDURE — C1769 GUIDE WIRE: HCPCS | Performed by: UROLOGY

## 2022-08-25 PROCEDURE — 700105 HCHG RX REV CODE 258: Performed by: UROLOGY

## 2022-08-25 PROCEDURE — 700102 HCHG RX REV CODE 250 W/ 637 OVERRIDE(OP): Performed by: UROLOGY

## 2022-08-25 PROCEDURE — 700101 HCHG RX REV CODE 250: Performed by: ANESTHESIOLOGY

## 2022-08-25 PROCEDURE — 770006 HCHG ROOM/CARE - MED/SURG/GYN SEMI*

## 2022-08-25 PROCEDURE — 160009 HCHG ANES TIME/MIN: Performed by: UROLOGY

## 2022-08-25 PROCEDURE — 88300 SURGICAL PATH GROSS: CPT

## 2022-08-25 PROCEDURE — 0T768DZ DILATION OF RIGHT URETER WITH INTRALUMINAL DEVICE, VIA NATURAL OR ARTIFICIAL OPENING ENDOSCOPIC: ICD-10-PCS | Performed by: UROLOGY

## 2022-08-25 PROCEDURE — C2617 STENT, NON-COR, TEM W/O DEL: HCPCS | Performed by: UROLOGY

## 2022-08-25 PROCEDURE — 80048 BASIC METABOLIC PNL TOTAL CA: CPT

## 2022-08-25 PROCEDURE — 81001 URINALYSIS AUTO W/SCOPE: CPT

## 2022-08-25 PROCEDURE — 700105 HCHG RX REV CODE 258: Performed by: HOSPITALIST

## 2022-08-25 PROCEDURE — C1758 CATHETER, URETERAL: HCPCS | Performed by: UROLOGY

## 2022-08-25 PROCEDURE — 74018 RADEX ABDOMEN 1 VIEW: CPT

## 2022-08-25 PROCEDURE — 99291 CRITICAL CARE FIRST HOUR: CPT

## 2022-08-25 PROCEDURE — 36415 COLL VENOUS BLD VENIPUNCTURE: CPT

## 2022-08-25 PROCEDURE — 0TC68ZZ EXTIRPATION OF MATTER FROM RIGHT URETER, VIA NATURAL OR ARTIFICIAL OPENING ENDOSCOPIC: ICD-10-PCS | Performed by: UROLOGY

## 2022-08-25 PROCEDURE — 99222 1ST HOSP IP/OBS MODERATE 55: CPT | Performed by: HOSPITALIST

## 2022-08-25 PROCEDURE — 700111 HCHG RX REV CODE 636 W/ 250 OVERRIDE (IP): Performed by: ANESTHESIOLOGY

## 2022-08-25 PROCEDURE — 00918 ANES TRURL PX URTRL CAL RMVL: CPT | Performed by: ANESTHESIOLOGY

## 2022-08-25 DEVICE — STENT UROLOGICAL POLARIS 6X26  ULTRA: Type: IMPLANTABLE DEVICE | Site: URETER | Status: FUNCTIONAL

## 2022-08-25 RX ORDER — ONDANSETRON 2 MG/ML
4 INJECTION INTRAMUSCULAR; INTRAVENOUS
Status: DISCONTINUED | OUTPATIENT
Start: 2022-08-25 | End: 2022-08-25 | Stop reason: HOSPADM

## 2022-08-25 RX ORDER — DIPHENHYDRAMINE HYDROCHLORIDE 50 MG/ML
12.5 INJECTION INTRAMUSCULAR; INTRAVENOUS
Status: DISCONTINUED | OUTPATIENT
Start: 2022-08-25 | End: 2022-08-25 | Stop reason: HOSPADM

## 2022-08-25 RX ORDER — ONDANSETRON 2 MG/ML
INJECTION INTRAMUSCULAR; INTRAVENOUS PRN
Status: DISCONTINUED | OUTPATIENT
Start: 2022-08-25 | End: 2022-08-25 | Stop reason: SURG

## 2022-08-25 RX ORDER — METOCLOPRAMIDE HYDROCHLORIDE 5 MG/ML
INJECTION INTRAMUSCULAR; INTRAVENOUS PRN
Status: DISCONTINUED | OUTPATIENT
Start: 2022-08-25 | End: 2022-08-25 | Stop reason: SURG

## 2022-08-25 RX ORDER — LIDOCAINE HYDROCHLORIDE 20 MG/ML
INJECTION, SOLUTION EPIDURAL; INFILTRATION; INTRACAUDAL; PERINEURAL PRN
Status: DISCONTINUED | OUTPATIENT
Start: 2022-08-25 | End: 2022-08-25 | Stop reason: SURG

## 2022-08-25 RX ORDER — AMOXICILLIN 250 MG
2 CAPSULE ORAL 2 TIMES DAILY
Status: DISCONTINUED | OUTPATIENT
Start: 2022-08-25 | End: 2022-08-26 | Stop reason: HOSPADM

## 2022-08-25 RX ORDER — LIDOCAINE HYDROCHLORIDE 20 MG/ML
JELLY TOPICAL
Status: DISCONTINUED | OUTPATIENT
Start: 2022-08-25 | End: 2022-08-25 | Stop reason: HOSPADM

## 2022-08-25 RX ORDER — SODIUM CHLORIDE 9 MG/ML
INJECTION, SOLUTION INTRAVENOUS CONTINUOUS
Status: DISCONTINUED | OUTPATIENT
Start: 2022-08-25 | End: 2022-08-26 | Stop reason: HOSPADM

## 2022-08-25 RX ORDER — BISACODYL 10 MG
10 SUPPOSITORY, RECTAL RECTAL
Status: DISCONTINUED | OUTPATIENT
Start: 2022-08-25 | End: 2022-08-26 | Stop reason: HOSPADM

## 2022-08-25 RX ORDER — HALOPERIDOL 5 MG/ML
1 INJECTION INTRAMUSCULAR
Status: DISCONTINUED | OUTPATIENT
Start: 2022-08-25 | End: 2022-08-25 | Stop reason: HOSPADM

## 2022-08-25 RX ORDER — SODIUM CHLORIDE, SODIUM LACTATE, POTASSIUM CHLORIDE, CALCIUM CHLORIDE 600; 310; 30; 20 MG/100ML; MG/100ML; MG/100ML; MG/100ML
INJECTION, SOLUTION INTRAVENOUS CONTINUOUS
Status: ACTIVE | OUTPATIENT
Start: 2022-08-25 | End: 2022-08-25

## 2022-08-25 RX ORDER — ACETAMINOPHEN 325 MG/1
650 TABLET ORAL EVERY 6 HOURS PRN
Status: DISCONTINUED | OUTPATIENT
Start: 2022-08-25 | End: 2022-08-26 | Stop reason: HOSPADM

## 2022-08-25 RX ORDER — KETOROLAC TROMETHAMINE 30 MG/ML
INJECTION, SOLUTION INTRAMUSCULAR; INTRAVENOUS PRN
Status: DISCONTINUED | OUTPATIENT
Start: 2022-08-25 | End: 2022-08-25 | Stop reason: SURG

## 2022-08-25 RX ORDER — KETOROLAC TROMETHAMINE 10 MG/1
10 TABLET, FILM COATED ORAL EVERY 6 HOURS PRN
Qty: 15 TABLET | Refills: 0
Start: 2022-08-25

## 2022-08-25 RX ORDER — ATROPA BELLADONNA AND OPIUM 16.2; 3 MG/1; MG/1
SUPPOSITORY RECTAL
Status: DISCONTINUED | OUTPATIENT
Start: 2022-08-25 | End: 2022-08-25 | Stop reason: HOSPADM

## 2022-08-25 RX ORDER — PHENAZOPYRIDINE HYDROCHLORIDE 200 MG/1
200 TABLET, FILM COATED ORAL 3 TIMES DAILY PRN
Qty: 12 TABLET | Refills: 0 | Status: SHIPPED | OUTPATIENT
Start: 2022-08-25

## 2022-08-25 RX ORDER — CEFAZOLIN SODIUM 1 G/3ML
INJECTION, POWDER, FOR SOLUTION INTRAMUSCULAR; INTRAVENOUS PRN
Status: DISCONTINUED | OUTPATIENT
Start: 2022-08-25 | End: 2022-08-25 | Stop reason: SURG

## 2022-08-25 RX ORDER — IOPAMIDOL 408 MG/ML
INJECTION, SOLUTION INTRATHECAL
Status: DISCONTINUED | OUTPATIENT
Start: 2022-08-25 | End: 2022-08-25 | Stop reason: HOSPADM

## 2022-08-25 RX ORDER — POLYETHYLENE GLYCOL 3350 17 G/17G
1 POWDER, FOR SOLUTION ORAL
Status: DISCONTINUED | OUTPATIENT
Start: 2022-08-25 | End: 2022-08-26 | Stop reason: HOSPADM

## 2022-08-25 RX ORDER — SODIUM CHLORIDE 9 MG/ML
INJECTION, SOLUTION INTRAVENOUS CONTINUOUS
Status: DISCONTINUED | OUTPATIENT
Start: 2022-08-25 | End: 2022-08-25

## 2022-08-25 RX ORDER — SUCCINYLCHOLINE CHLORIDE 20 MG/ML
INJECTION INTRAMUSCULAR; INTRAVENOUS PRN
Status: DISCONTINUED | OUTPATIENT
Start: 2022-08-25 | End: 2022-08-25 | Stop reason: SURG

## 2022-08-25 RX ADMIN — KETOROLAC TROMETHAMINE 30 MG: 30 INJECTION, SOLUTION INTRAMUSCULAR at 18:22

## 2022-08-25 RX ADMIN — METOCLOPRAMIDE 10 MG: 5 INJECTION, SOLUTION INTRAMUSCULAR; INTRAVENOUS at 18:22

## 2022-08-25 RX ADMIN — CEFAZOLIN 1 G: 330 INJECTION, POWDER, FOR SOLUTION INTRAMUSCULAR; INTRAVENOUS at 17:02

## 2022-08-25 RX ADMIN — SODIUM CHLORIDE: 9 INJECTION, SOLUTION INTRAVENOUS at 21:17

## 2022-08-25 RX ADMIN — EPHEDRINE SULFATE 10 MG: 50 INJECTION INTRAMUSCULAR; INTRAVENOUS; SUBCUTANEOUS at 17:18

## 2022-08-25 RX ADMIN — SODIUM CHLORIDE, POTASSIUM CHLORIDE, SODIUM LACTATE AND CALCIUM CHLORIDE: 600; 310; 30; 20 INJECTION, SOLUTION INTRAVENOUS at 17:02

## 2022-08-25 RX ADMIN — SODIUM CHLORIDE, POTASSIUM CHLORIDE, SODIUM LACTATE AND CALCIUM CHLORIDE: 600; 310; 30; 20 INJECTION, SOLUTION INTRAVENOUS at 16:36

## 2022-08-25 RX ADMIN — GLYCOPYRROLATE 0.2 MG: 0.2 INJECTION INTRAMUSCULAR; INTRAVENOUS at 17:17

## 2022-08-25 RX ADMIN — ONDANSETRON 4 MG: 2 INJECTION INTRAMUSCULAR; INTRAVENOUS at 18:22

## 2022-08-25 RX ADMIN — LIDOCAINE HYDROCHLORIDE 100 MG: 20 INJECTION, SOLUTION EPIDURAL; INFILTRATION; INTRACAUDAL at 17:08

## 2022-08-25 RX ADMIN — PROPOFOL 200 MG: 10 INJECTION, EMULSION INTRAVENOUS at 17:08

## 2022-08-25 RX ADMIN — SUCCINYLCHOLINE CHLORIDE 10 MG: 20 INJECTION, SOLUTION INTRAMUSCULAR; INTRAVENOUS; PARENTERAL at 18:23

## 2022-08-25 ASSESSMENT — COGNITIVE AND FUNCTIONAL STATUS - GENERAL
MOBILITY SCORE: 24
SUGGESTED CMS G CODE MODIFIER DAILY ACTIVITY: CH
SUGGESTED CMS G CODE MODIFIER MOBILITY: CH
DAILY ACTIVITIY SCORE: 24

## 2022-08-25 ASSESSMENT — ENCOUNTER SYMPTOMS
FEVER: 0
VOMITING: 0
COUGH: 0
CHILLS: 0
FLANK PAIN: 1
NAUSEA: 0
ABDOMINAL PAIN: 1

## 2022-08-25 ASSESSMENT — PAIN DESCRIPTION - PAIN TYPE
TYPE: SURGICAL PAIN

## 2022-08-25 ASSESSMENT — LIFESTYLE VARIABLES
ALCOHOL_USE: NO
TOTAL SCORE: 0
EVER HAD A DRINK FIRST THING IN THE MORNING TO STEADY YOUR NERVES TO GET RID OF A HANGOVER: NO
ON A TYPICAL DAY WHEN YOU DRINK ALCOHOL HOW MANY DRINKS DO YOU HAVE: 0
AVERAGE NUMBER OF DAYS PER WEEK YOU HAVE A DRINK CONTAINING ALCOHOL: 0
HAVE PEOPLE ANNOYED YOU BY CRITICIZING YOUR DRINKING: NO
CONSUMPTION TOTAL: NEGATIVE
TOTAL SCORE: 0
TOTAL SCORE: 0
EVER FELT BAD OR GUILTY ABOUT YOUR DRINKING: NO
HAVE YOU EVER FELT YOU SHOULD CUT DOWN ON YOUR DRINKING: NO
HOW MANY TIMES IN THE PAST YEAR HAVE YOU HAD 5 OR MORE DRINKS IN A DAY: 0

## 2022-08-25 ASSESSMENT — PATIENT HEALTH QUESTIONNAIRE - PHQ9
SUM OF ALL RESPONSES TO PHQ9 QUESTIONS 1 AND 2: 0
1. LITTLE INTEREST OR PLEASURE IN DOING THINGS: NOT AT ALL
2. FEELING DOWN, DEPRESSED, IRRITABLE, OR HOPELESS: NOT AT ALL

## 2022-08-25 NOTE — ANESTHESIA PREPROCEDURE EVALUATION
Case: [id number] Date/Time: 08/25/22 1277    Procedures:       CYSTOSCOPY, WITH URETERAL STENT INSERTION (Right)      LITHOTRIPSY, USING LASER    Location:  OR 02 / SURGERY [facility]    Surgeons: Ken Conklin M.D.          Relevant Problems   CARDIAC   (positive) PAF (paroxysmal atrial fibrillation) (HCC)       Physical Exam    Airway   Mallampati: II  TM distance: >3 FB  Neck ROM: full       Cardiovascular - normal exam  Rhythm: regular  Rate: normal  (-) murmur     Dental - normal exam           Pulmonary - normal exam  Breath sounds clear to auscultation     Abdominal    Neurological - normal exam                 Anesthesia Plan    ASA 2       Plan - general       Airway plan will be LMA          Induction: intravenous          Informed Consent:    Anesthetic plan and risks discussed with patient.

## 2022-08-25 NOTE — ED NOTES
"  Pharmacy Medication Reconciliation      ~Medication reconciliation updated and complete per patient at bedside  ~Allergies have been verified  ~No oral ABX within the last 30 days  ~Patient home pharmacy: [facility]      ~Patient reports he is taking a \"bunch\" of vitamins and supplements but, is unable to remember all of them at this time.         "

## 2022-08-25 NOTE — OR NURSING
Patient allergies and NPO status verified, home medication reconciliation completed and belongings secured. Pt verbalizes understanding of pain scale, expected course of stay, and plan of care. Surgical site verified w/ pt. IV access flushed and IVF initiated per protocol. Sequentials placed on legs. Triple aim completed.

## 2022-08-25 NOTE — ASSESSMENT & PLAN NOTE
Urology has been consulted by [facility], they are planning to take this patient to the OR around 530 this afternoon.  N.p.o. until then.

## 2022-08-25 NOTE — CONSULTS
[facility] [place] Consult/H&P Note    Patient's Name/MRN: Jose Manuel Aguirre, [id number]   Room #: -[place] [facility]/06H    Admit Date:8/25/2022  Today's Date: 8/25/2022   Length of stay:  LOS: 0 days      Reason for consult/chief complaint: right flank pain  ID/HPI: Jose Manuel Aguirre is a 57 y.o. male patient who p/w severe 6-7/10 flank pain on right side with assoc hematuria. He has had -N, -V, -F, -C.  This is not his  episode of stones. Has had prior stone procedures in past.  In ER, WBC was 5.4, UA was not c/w infection, and Cr was 0.78, near baseline.  CT was doen showing 3 distal righ ureteral stones, largest 9mm.      Past Medical History:   Past Medical History:   Diagnosis Date    Kidney stones     PAF (paroxysmal atrial fibrillation) (HCC)         Past Surgical History:   Past Surgical History:   Procedure Laterality Date    VT LAP,SURG,COLECTOMY, PARTIAL, W/ANAST  1996    d/t colitis    OTHER ABDOMINAL SURGERY          Family History:   Family History   Problem Relation Age of Onset    Stroke Brother          Social History:   Social History     Tobacco Use    Smoking status: Every Day     Packs/day: 0.50     Years: 30.00     Pack years: 15.00     Types: Cigarettes    Smokeless tobacco: Never   Substance Use Topics    Alcohol use: No    Drug use: No     Comment: cocaine, clean since 2001      Social History     Social History Narrative    Not on file        Allergies: he Patient has no known allergies.    Medications:   (Not in a hospital admission)        Review of Systems  Review of Systems   Constitutional:  Negative for chills and fever.   Cardiovascular:  Negative for chest pain.   Gastrointestinal:  Negative for abdominal pain, nausea and vomiting.   Genitourinary:  Positive for flank pain and hematuria. Negative for dysuria, frequency and urgency.   All other systems reviewed and are negative.     Physical Exam  VITAL SIGNS: /71   Pulse (!) 48   Temp 36.3 °C (97.3 °F) (Temporal)    "Resp 16   Ht 1.753 m (5' 9\")   Wt 72.3 kg (159 lb 6.3 oz)   SpO2 95%   BMI 23.54 kg/m²   GENERAL:  alert, in no acute distress  EYES: EOMI and normal accomodation  Neck: Supple  BACK: No CVA tenderness.   CHEST AND LUNGS: good air entry, no audible wheezes  CARDIOVASCULAR: Rate is regular, no peripheral edema.   ABDOMEN: Abdomen soft, nontender. No masses, organomegaly.  : right CVAT  EXTREMITIES: Warm and well perfused without c/c/e  NEURO: No focal deficits  SKIN: Skin color, texture, turgor normal. No rashes, lesions, nor jaundice.      Labs:  Recent Labs     08/25/22  1405   WBC 5.4   RBC 4.70   HEMOGLOBIN 15.1   HEMATOCRIT 45.1   MCV 96.0   MCH 32.1   MCHC 33.5*   RDW 44.5   PLATELETCT 170   MPV 10.6     Recent Labs     08/25/22  1405   SODIUM 139   POTASSIUM 4.2   CHLORIDE 105   CO2 25   GLUCOSE 71   BUN 10   CREATININE 0.78   CALCIUM 9.1         Glucose:  Lab Results   Component Value Date/Time    GLUCOSE 71 08/25/2022 02:05 PM    GLUCOSE 67 07/25/2020 12:30 PM    GLUCOSE 70 04/05/2012 12:25 PM     Coags:  Lab Results   Component Value Date/Time    INR 0.98 04/05/2012 12:25 PM         Urinalysis:   Lab Results   Component Value Date/Time    COLORURINE Straw 08/25/2022 12:42 PM    CLARITY Clear 08/25/2022 12:42 PM    SPECGRAVITY <=1.005 08/25/2022 12:42 PM    PHURINE 6.0 08/25/2022 12:42 PM    GLUCOSEUR Negative 08/25/2022 12:42 PM    KETONES Negative 08/25/2022 12:42 PM    NITRITE Negative 08/25/2022 12:42 PM    OCCULTBLOOD Large (A) 08/25/2022 12:42 PM    RBCURINE 10-20 (A) 08/25/2022 12:42 PM    BACTERIA Rare (A) 08/25/2022 12:42 PM       Imaging:                                                     Assessment/Recommendation   57 y.o.male with 3 distal right ureteral stones.  He understands the risk of inability to access ureter, the need for second procedures, the possibility of negative ureteroscopy, that he may have stent discomfort until this is removed, bleeding, infection, ureteral injury or " stricture, bladder injury, post op urinary retention requiring bui catheter, and the general pulmonary and cardiovascular risks associated with anesthesia.  After a full discussion of the alternatives risks and benefits of the procedure, the patient consented to proceeding with the planned procedure.     Consent obatined  Add on for Cystoscopy, right ureteroscopy, Laser lithotripsy and JJ stents (CULTS) later this evening.        Jyoti Conklin, PDickA.-C.   [id number] [id number]   [phone]

## 2022-08-25 NOTE — ED PROVIDER NOTES
ED Provider Note    Scribed for Luis Jack M.D. by Rocío Serrato. 8/25/2022  12:43 PM    Primary care provider: Caitlin Sargent  Means of arrival: Walk In  History obtained from: Patient  History limited by: None    CHIEF COMPLAINT  Chief Complaint   Patient presents with    Flank Pain     Onset of right flank pain which also radiates to his front abd. Pain is increasing and  has been coming on for a few months. He did have to have a procedure to remove a stone 14 yrs ago.       HPI  Jose Manuel Aguirre is a 57 y.o. male who presents to the Emergency Department for intermittent right flank pain that began two months ago and significantly worsened two days ago. Patient reports that at initial onset he noticed small amount of blood in his urine, mild flank pain, and increased tiredness. However the pain did not persist, until it returned and increased in severity two weeks ago and significantly worsened again two days ago. His pain radiated to his right lower quadrant. He denies fevers, nausea, or vomiting. He does have a history of kidney stones, and underwent lithotripsy about 15 years ago for a particularly large one.     REVIEW OF SYSTEMS  Pertinent positives include right flank pain, hematuria, tiredness.   Pertinent negatives include no fevers, nausea, or vomiting.    All other systems reviewed and negative. See HPI for further details.       PAST MEDICAL HISTORY   has a past medical history of Kidney stones and PAF (paroxysmal atrial fibrillation) (HCC).    SURGICAL HISTORY   has a past surgical history that includes other abdominal surgery and lap,surg,colectomy, partial, w/anast (1996).    SOCIAL HISTORY  Social History     Tobacco Use    Smoking status: Every Day     Packs/day: 0.50     Years: 30.00     Pack years: 15.00     Types: Cigarettes    Smokeless tobacco: Never   Substance Use Topics    Alcohol use: No    Drug use: No     Comment: cocaine, clean since 2001      Social History  "    Substance and Sexual Activity   Drug Use No    Comment: cocaine, clean since 2001       FAMILY HISTORY  Family History   Problem Relation Age of Onset    Stroke Brother        CURRENT MEDICATIONS  Current Outpatient Medications   Medication Instructions    clotrimazole-betamethasone (LOTRISONE) 1-0.05 % Cream 1 Application, Topical, 2 TIMES DAILY         ALLERGIES  No Known Allergies    PHYSICAL EXAM  VITAL SIGNS: /71   Pulse (!) 48   Temp 36.3 °C (97.3 °F) (Temporal)   Resp 16   Ht 1.753 m (5' 9\")   Wt 72.3 kg (159 lb 6.3 oz)   SpO2 95%   BMI 23.54 kg/m²     Nursing note and vitals reviewed.  Constitutional: Well-developed and well-nourished.  Mild distress secondary to pain but stoic.  HENT: Head is normocephalic and atraumatic. Oropharynx is clear and moist without exudate or erythema.   Eyes: Pupils are equal, round, and reactive to light. Conjunctiva are normal.   Cardiovascular: Normal rate and regular rhythm. No murmur heard. Normal radial pulses.  Pulmonary/Chest: Breath sounds normal. No wheezes or rales.   Abdominal: Soft and non-tender. No distention    Musculoskeletal: Extremities exhibit normal range of motion without edema or tenderness.   Neurological: Awake, alert and oriented to person, place, and time. No focal deficits noted.  Skin: Skin is warm and dry. No rash.   Psychiatric: Normal mood and affect. Appropriate for clinical situation.    DIAGNOSTIC STUDIES / PROCEDURES    LABS  Results for orders placed or performed during the hospital encounter of 08/25/22   URINALYSIS CULTURE, IF INDICATED    Specimen: Urine   Result Value Ref Range    Color Straw     Character Clear     Specific Gravity <=1.005 <1.035    Ph 6.0 5.0 - 8.0    Glucose Negative Negative mg/dL    Ketones Negative Negative mg/dL    Protein Negative Negative mg/dL    Bilirubin Negative Negative    Nitrite Negative Negative    Leukocyte Esterase Small (A) Negative    Occult Blood Large (A) Negative    Micro Urine Req " Microscopic    URINE MICROSCOPIC (W/UA)   Result Value Ref Range    WBC 5-10 (A) /hpf    RBC 10-20 (A) /hpf    Bacteria Rare (A) None /hpf    Urine Crystals Rare Amorphous /hpf       All labs reviewed by me.    RADIOLOGY  CT-RENAL COLIC EVALUATION(A/P W/O)   Final Result         1. Three obstructing calculi in the distal right ureter with moderate to severe right hydronephrosis.      2. Mild diffuse wall thickening of the urinary bladder could relate to underdistention.        The radiologist's interpretation of all radiological studies have been reviewed by me.    COURSE & MEDICAL DECISION MAKING  Nursing notes, VS, PMSFHx reviewed in chart.     12:43 PM - Patient seen and examined at bedside.  Patient declines pain medication at this time.  States that he would just like to deal with it.  Ordered CT Renal Colic and UA with culture to evaluate his symptoms. The differential diagnoses include but are not limited to: Urinary tract infection, ureterolithiasis    1:53 PM CT scan demonstrates 3 distal ureteral calculi with the largest measuring up to 9 mm.  Obstructive changes noted.  Urinalysis does not demonstrate evidence of infection.  [facility]    2:05 PM spoke with Dr. Conklin.  He request hospitalist admission.  He will plan to take the patient to the OR.  Would like the patient to be strict n.p.o. starting now.    FINAL IMPRESSION  1. Flank pain    2. Ureterolithiasis          Rocío AG (Scribe), am scribing for, and in the presence of, Luis Jack M.D..    Electronically signed by: Rocío Serrato (Scribe), 8/25/2022    Luis AG M.D. personally performed the services described in this documentation, as scribed by Rocío Serrato in my presence, and it is both accurate and complete.    The note accurately reflects work and decisions made by me.  Luis Jack M.D.  8/25/2022  2:04 PM

## 2022-08-25 NOTE — CONSULTS
[facility] Consult/H&P Note    Patient's Name/MRN: Jose Manuel Aguirre, [id number]   Room #: -[facility]/06[facility]    Admit Date:8/25/2022  Today's Date: 8/25/2022   Length of stay:  LOS: 0 days      Reason for consult/chief complaint: R obstructing ureteral stones  ID/HPI: Jose Manuel Aguirre is a 57 y.o. male patient who p/w severe 10/10 flank pain on his right side, present intermittently for several months but worsening in the last 2 days. He has had -N, -V, -F, -C.  This is his second episode of stones. Has had prior stone procedures in past, states 15 years ago he underwent ureteroscopy, laser lithotripsy, and ureteral stent placement.  In ER, WBC was 5.4, UA was not c/w infection, and Cr was 0.78. CT was doen showing 3 stones measuring up to 9mm stone in the R UVJ with hydronephrosis. He has been NPO since last night.     Past Medical History:   Past Medical History:   Diagnosis Date    Kidney stones     PAF (paroxysmal atrial fibrillation) (HCC)         Past Surgical History:   Past Surgical History:   Procedure Laterality Date    [place] LAP,SURG,COLECTOMY, PARTIAL, W/ANAST  1996    d/t colitis    OTHER ABDOMINAL SURGERY          Family History:   Family History   Problem Relation Age of Onset    Stroke Brother          Social History:   Social History     Tobacco Use    Smoking status: Every Day     Packs/day: 0.50     Years: 30.00     Pack years: 15.00     Types: Cigarettes    Smokeless tobacco: Never   Substance Use Topics    Alcohol use: No    Drug use: No     Comment: cocaine, clean since 2001      Social History     Social History Narrative    Not on file        Allergies: he Patient has no known allergies.    Medications:   (Not in a hospital admission)        Review of Systems  Review of Systems   Constitutional:  Negative for chills and fever.   Respiratory:  Negative for cough.    Cardiovascular:  Negative for chest pain.   Gastrointestinal:  Positive for abdominal pain. Negative for nausea and  "vomiting.   Genitourinary:  Positive for dysuria, flank pain and hematuria.   All other systems reviewed and are negative.     Physical Exam  VITAL SIGNS: /74   Pulse (!) 42   Temp 36.3 °C (97.3 °F) (Temporal)   Resp 14   Ht 1.753 m (5' 9\")   Wt 72.3 kg (159 lb 6.3 oz)   SpO2 96%   BMI 23.54 kg/m²   GENERAL:  alert, in no acute distress  EYES: EOMI and normal accomodation  Neck: Supple  BACK: R CVAT  CHEST AND LUNGS: good air entry, no audible wheezes  CARDIOVASCULAR: Rate is regular, no peripheral edema.   ABDOMEN: Abdomen soft,  R CVAT  EXTREMITIES: Warm and well perfused without c/c/e  NEURO: No focal deficits  SKIN: Skin color, texture, turgor normal. No rashes, lesions, nor jaundice.      Labs:  Recent Labs     08/25/22  1405   WBC 5.4   RBC 4.70   HEMOGLOBIN 15.1   HEMATOCRIT 45.1   MCV 96.0   MCH 32.1   MCHC 33.5*   RDW 44.5   PLATELETCT 170   MPV 10.6     Recent Labs     08/25/22  1405   SODIUM 139   POTASSIUM 4.2   CHLORIDE 105   CO2 25   GLUCOSE 71   BUN 10   CREATININE 0.78   CALCIUM 9.1         Glucose:  Lab Results   Component Value Date/Time    GLUCOSE 71 08/25/2022 02:05 PM    GLUCOSE 67 07/25/2020 12:30 PM    GLUCOSE 70 04/05/2012 12:25 PM     Coags:  Lab Results   Component Value Date/Time    INR 0.98 04/05/2012 12:25 PM         Urinalysis:   Lab Results   Component Value Date/Time    COLORURINE Straw 08/25/2022 12:42 PM    CLARITY Clear 08/25/2022 12:42 PM    SPECGRAVITY <=1.005 08/25/2022 12:42 PM    PHURINE 6.0 08/25/2022 12:42 PM    GLUCOSEUR Negative 08/25/2022 12:42 PM    KETONES Negative 08/25/2022 12:42 PM    NITRITE Negative 08/25/2022 12:42 PM    OCCULTBLOOD Large (A) 08/25/2022 12:42 PM    RBCURINE 10-20 (A) 08/25/2022 12:42 PM    BACTERIA Rare (A) 08/25/2022 12:42 PM       Imaging:                                                     Assessment/Recommendation   57 y.o. M with 3 right UVJ stones measuring up to 9mm, with associated hydronephrosis. Discussed treatment option of " proceeding with R CULTS.  He understands the risk of inability to access ureter, the need for second procedures, the possibility of negative ureteroscopy, that he may have stent discomfort until this is removed, bleeding, infection, ureteral injury or stricture, bladder injury, post op urinary retention requiring bui catheter, and the general pulmonary and cardiovascular risks associated with anesthesia.  After a full discussion of the alternatives risks and benefits of the procedure, the patient consented to proceeding with the planned procedure.     Consent obtained  Add on for Cystoscopy, right ureteroscopy, Laser lithotripsy and JJ stents (CULTS)  Dr. Conklin is aware of consult and has directed this patient's plan of care.       TIGRE Martinez-C.   [address] [place] [address]   [phone]

## 2022-08-25 NOTE — H&P
[facility] History & Physical Note    Date of Service  8/25/2022    Primary Care Physician  Caitlin Sargent    Consultants  Urology    Code Status  Prior    Chief Complaint  Chief Complaint   Patient presents with    Flank Pain     Onset of right flank pain which also radiates to his front abd. Pain is increasing and  has been coming on for a few months. He did have to have a procedure to remove a stone 14 yrs ago.       History of Presenting Illness  Patient is a 57-year-old generally healthy man who has a history of kidney stones, underwent a lithotripsy about 15 years ago.  Comes in today with right flank pain reminiscent of prior episodes.  It began approximately 2 days ago and has been gradually worsening.  Patient presented to the emergency department after noticing blood in his urine this morning.  Acuity is acute, severity is severe, timing is irrelevant, priors per above, location is genitourinary, no particular alleviating or aggravating factors, associated factors include fatigue, onset is insidious.    Review of Systems  All systems reviewed and negative except as noted per above.    Past Medical History   has a past medical history of Kidney stones and PAF (paroxysmal atrial fibrillation) (HCC).    Surgical History   has a past surgical history that includes other abdominal surgery and pr lap,surg,colectomy, partial, w/anast (1996).     Family History  family history includes Stroke in his brother.       Social History   reports that he has been smoking cigarettes. He has a 15.00 pack-year smoking history. He has never used smokeless tobacco. He reports that he does not drink alcohol and does not use drugs.    Allergies  No Known Allergies    Medications  None       Physical Exam  Temp:  [36.3 °C (97.3 °F)] 36.3 °C (97.3 °F)  Pulse:  [48] 48  Resp:  [16] 16  BP: (112)/(71) 112/71  SpO2:  [95 %] 95 %  Blood Pressure: 112/71   Temperature: 36.3 °C (97.3 °F)   Pulse: (!) 48   Respiration: 16    Pulse Oximetry: 95 %       General appearance: NAD, conversant  Neck: FROM, supple  Lungs: Clear to auscultation  CV: RRR, no MRGs; normal carotid upstroke and amplitude without bruits  Abdomen: Soft, non-tender; no masses or HSM  Extremities: No peripheral edema or digital cyanosis  Musculoskeletal: Very slight tenderness to percussion over the right CVA  Skin: no rash, lesions or ulcers  Psych: Alert and oriented to person, place and time    Laboratory:  Recent Labs     08/25/22  1405   WBC 5.4   RBC 4.70   HEMOGLOBIN 15.1   HEMATOCRIT 45.1   MCV 96.0   MCH 32.1   MCHC 33.5*   RDW 44.5   PLATELETCT 170   MPV 10.6     Recent Labs     08/25/22  1405   SODIUM 139   POTASSIUM 4.2   CHLORIDE 105   CO2 25   GLUCOSE 71   BUN 10   CREATININE 0.78   CALCIUM 9.1     Recent Labs     08/25/22  1405   GLUCOSE 71         No results for input(s): NTPROBNP in the last 72 hours.      No results for input(s): TROPONINT in the last 72 hours.    Imaging:  CT-RENAL COLIC EVALUATION(A/P W/O)   Final Result         1. Three obstructing calculi in the distal right ureter with moderate to severe right hydronephrosis.      2. Mild diffuse wall thickening of the urinary bladder could relate to underdistention.          No EKG requiring interpretation.    Assessment/Plan:  I do anticipate greater than 2 midnights, inpatient status accordingly.    * Right ureteral stone- (present on admission)  Assessment & Plan  Urology has been consulted by ER MD, they are planning to take this patient to the OR around 530 this afternoon.  N.p.o. until then.        VTE prophylaxis: SCDs/TEDs

## 2022-08-25 NOTE — ED TRIAGE NOTES
Chief Complaint   Patient presents with    Flank Pain     Onset of right flank pain which also radiates to his front abd. Pain is increasing and  has been coming on for a few months. He did have to have a procedure to remove a stone 14 yrs ago.

## 2022-08-26 VITALS
HEART RATE: 67 BPM | RESPIRATION RATE: 18 BRPM | OXYGEN SATURATION: 91 % | TEMPERATURE: 97.9 F | BODY MASS INDEX: 24.44 KG/M2 | WEIGHT: 165 LBS | HEIGHT: 69 IN | DIASTOLIC BLOOD PRESSURE: 52 MMHG | SYSTOLIC BLOOD PRESSURE: 95 MMHG

## 2022-08-26 PROCEDURE — 94760 N-INVAS EAR/PLS OXIMETRY 1: CPT

## 2022-08-26 NOTE — PROGRESS NOTES
Patient requesting to be discharged.  Voiding without difficulty and tolerating diet well.  Informed patient that hospitalist will round on him in the AM and possibly discharge.  Patient stated he did not want to wait until the morning.  Dr. Kasper informed of AMA.  IV removed.  Patient left the floor with steady gait and discharged at 0255.  Primary RN, Saira, rossi.

## 2022-08-26 NOTE — OP REPORT
[facility] Operative Report    Pre-operative Diagnosis: 1.  Multiple right distal ureteral calculi   Post-operative Diagnosis: Same as above   Procedure: 1.  Cystoscopy with right retrograde pyelogram    2.  Right ureteroscopy with laser lithotripsy of multiple (at least 3) large right distal ureteral calculi    3.  Basket stone extraction    4.  Placement of 6 Turkmen x26 cm JJ ureteral stent; no string attached   Surgeon: Ken Conklin M.D., MD   Assistant: None   Anesthesia: General  Anesthesiologist: Abimael López M.D.    Estimated Blood Loss: Minimal   IV fluids Less than 1000 cc crystalloid   Specimens: 1.  Right ureteral stone fragments   Drains: Internal right JJ ureteral stent, not on string   Complications: None   Wound class Clean contaminated   Condition: Stable, procedure well tolerated    Disposition:  Stable to PACU    Findings: There was marked bullous edema of the right ureteral orifice with a stone  at that location.  This made access of the ureter difficult.  There was significant mucosal edema in the last 1-1/2 to 2 cm of the right ureter.  There was dilation above this.  Multiple stones, at least 3, read the distal ureter.  These were fragmented and fragments extracted.  Because of the mucosal edema and trauma from treating the stones within the edematous ureter I elected to leave a stent in place     Indications for Procedure:  Jose Manuel Aguirre is a 57 y.o. male who presented with several month history of intermittent right lower quadrant and back pain.  It became difficult to manage over the last couple days and he presented to the emergency room where CT scan showed multiple right distal ureteral calculi.  As he had not made progress in passing the stones over the last 30 to 60 days he elected removal    Procedure in Detail:  The patient was identified in the holding area, and taken to the operative suite.  He was positioned supine on the operating room table in the  dorsal lithotomy position.  General anesthesia was administered by Dr. López.  Cefazolin was given intravenously.  Timeout was called.  Correct patient and site of surgery was confirmed.    A 22 Guinean cystourethroscope was advanced through the urethral meatus into the urethra.  No stricturing was noted.  The prostate was moderately enlarged.  The bladder was entered.  There was mild trabeculation but no foreign bodies tumors or stones.  The right ureteral orifice was quite edematous and the interureteric ridge slightly distorted from this.  The area of the intramural ureter appeared swollen.  Urine was drained.  The bladder was refilled.  Inspection of the bladder showed no foreign bodies tumors or stones.  Ureteral orifices were orthotopic.    The right ureter was cannulated with a 6 Guinean open-ended catheter.  Stone was visible and impacted at the right ureteral orifice.  It was difficult to navigate a sensor wire proximal to this but I was able to get it to coil in the right upper quadrant in the presumed region of the right renal pelvis.  Retrograde injection of dilute Cysto-Conray contrast was used to outline the dilated ureter above the stones..    A sensor wire was navigated proximal to this and coiled in the collecting system.    The ureteral orifice was dilated with a Genna dilator.  I then accessed the ureteral orifice with a semirigid ureteroscope; the scope had to be positioned just at the entrance into the ureteral orifice where I began treating the most distal stone.  The indwelling Sensor safety wire was left in place and coiled in the renal pelvis.    Using a 365 µm  holmium laser fiber the most distal stone was fragmented.  Energy settings were 0.6 J and 6 hz for fragmentation initially, increasing to 0.8 and 8 Hz, and ultimately 0.8 J and 10 Hz.  I broke the stones up and access the more proximal stones.  I then did some stone dusting with energy settings of 0.3 J and 40 Hz.  Stone fragments  were washed into the bladder and then required some basketing.  Stone fragments were then collected with a nitinol 0 tip basket and deposited in the bladder and later collected and submitted for pathologic analysis.  Inspection of the ureter at the end of the lithotripsy revealed just some stone dust.  I cannot really get above the level of the iliac vessels.  Retrograde pyelogram through the scope showed no filling defects to suggest more stone.    The sensor wire had become dislodged down into the proximal ureter.  It did not want to navigate up into the area of the renal pelvis and did some coiling.  I advanced an open-ended catheter over the wire remove the wire and performed retrograde pyelogram that showed a markedly dilated pelvis and a tortuous ureter in the area of the UPJ.  I advanced a stiff zip wire into the renal collecting system and then backloaded the zip wire onto a cystoscope.      A 6 Polish X 26 centimeters double-J ureteral stent was advanced over the wire with proximal coil formed in the renal collecting system and distal coil in the bladder.  A string was not left attached.    The bladder was emptied.  I collected stone fragments from the bladder and submitted for pathologic analysis.  The bladder and urethra were filled with 2% lidocaine jelly.  A 30 mg belladonna and opiate suppository was placed rectally.    The patient was sent to recovery room in stable condition.    Complications: None noted    Disposition: Stable to PACU          Ken Conklin M.D.   [address] [id number]   [phone]

## 2022-08-26 NOTE — OR SURGEON
Immediate Post OP Note    PreOp Diagnosis: Multiple right distal ureteral calculi      PostOp Diagnosis: Same      Procedure(s):  CYSTOSCOPY, rt ureteroscopy, WITH URETERAL STENT INSERTION - Wound Class: Clean Contaminated  LITHOTRIPSY, USING LASER - Wound Class: Clean Contaminated    Surgeon(s):  Ken Conklin M.D.    Anesthesiologist/Type of Anesthesia:  Anesthesiologist: Abimael López M.D./General    Surgical Staff:  Circulator: Aidee Finn R.N.  Scrub Person: Chantell Ludwig    Specimens removed if any:  ID Type Source Tests Collected by Time Destination   A : right ureteral stone fragments Stone Ureter PATHOLOGY SPECIMEN Ken Conklin M.D. 8/25/2022 1818        Estimated Blood Loss: Minimal    Findings: Markedly edematous and narrowed distal right ureter with multiple (at least 3) large distal ureteral calculi    Complications: None        8/25/2022 6:29 PM Ken Conklin M.D.

## 2022-08-26 NOTE — PROGRESS NOTES
4 Eyes Skin Assessment Completed by Saira RN and Qing RN.    Head WDL  Ears WDL  Nose WDL  Mouth WDL  Neck WDL  Breast/Chest WDL  Shoulder Blades WDL  Spine WDL  (R) Arm/Elbow/Hand WDL  (L) Arm/Elbow/Hand WDL  Abdomen WDL  Groin WDL  Scrotum/Coccyx/Buttocks WDL  (R) Leg WDL  (L) Leg WDL  (R) Heel/Foot/Toe WDL  (L) Heel/Foot/Toe WDL          Devices In Places Blood Pressure Cuff      Interventions In Place Pillows and Pressure Redistribution Mattress    Possible Skin Injury No    Pictures Uploaded Into Epic N/A  Wound Consult Placed N/A  RN Wound Prevention Protocol Ordered No

## 2022-08-26 NOTE — ANESTHESIA PROCEDURE NOTES
Airway    Date/Time: 8/25/2022 5:08 PM  Performed by: Abimael López M.D.  Authorized by: Abimael López M.D.     Location:  OR  Urgency:  Elective  Indications for Airway Management:  Anesthesia      Spontaneous Ventilation: absent    Sedation Level:  Deep  Preoxygenated: Yes    Final Airway Type:  Supraglottic airway  Final Supraglottic Airway:  Standard LMA    SGA Size:  4  Number of Attempts at Approach:  1

## 2022-08-26 NOTE — OR NURSING
1833- Patient arrived from OR via bed.    Sedation/Resp Status: Non responsive to verbal with OPA in place. Respirations spontaneous and non-labored.    HR 96 SR with occ PVC; VSS on 6L 02 via simple mask. The patient does not appear to be in pain or nauseous as evidence by sleeping.    1836- OPA out for return of spontaneous eye opening/gag reflex - respirations continue spontaneous and non-labored.     1840-  at bedside    1845- Patient denies pain or nausea.    1900- Patient denies pain or nausea. Family called and given updates.    1915- Patient denies pain or nausea. Provided patient a urinal because he felt the urge to void. Meets criteria to transfer to the floor.    1920- Attempted to call report to floor RN. Was told she was busy and I should call back in 5 minutes.    1944-  Gave report to Saira RN. Informed her that the patient needs to void prior to discharge and that he needs to be seen by the hospitalist.     1946- Transported to room on room air. All belongings with patient.

## 2022-08-26 NOTE — DISCHARGE INSTRUCTIONS
If any questions arise, call your provider.  If your provider is not available, please feel free to call the Surgical Center at [phone].    MEDICATIONS: Resume taking daily medication.  Take prescribed pain medication with food.  If no medication is prescribed, you may take non-aspirin pain medication if needed.  PAIN MEDICATION CAN BE VERY CONSTIPATING.  Take a stool softener or laxative such as senokot, pericolace, or milk of magnesia if needed.    Last pain medication given at N/A    What to Expect Post Anesthesia    Rest and take it easy for the first 24 hours.  A responsible adult is recommended to remain with you during that time.  It is normal to feel sleepy.  We encourage you to not do anything that requires balance, judgment or coordination.    FOR 24 HOURS DO NOT:  Drive, operate machinery or run household appliances.  Drink beer or alcoholic beverages.  Make important decisions or sign legal documents.    To avoid nausea, slowly advance diet as tolerated, avoiding spicy or greasy foods for the first day.  Add more substantial food to your diet according to your provider's instructions. INCREASE FLUIDS AND FIBER TO AVOID CONSTIPATION.    MILD FLU-LIKE SYMPTOMS ARE NORMAL.  YOU MAY EXPERIENCE GENERALIZED MUSCLE ACHES, THROAT IRRITATION, HEADACHE AND/OR SOME NAUSEA.    Diet  Resume your normal diet as tolerated.  A diet low in cholesterol, fat, and sodium is recommended for good health.     Ureteral Stent Implantation, Care After  This sheet gives you information about how to care for yourself after your procedure. Your health care provider may also give you more specific instructions. If you have problems or questions, contact your health care provider.  What can I expect after the procedure?  After the procedure, it is common to have:  Nausea.  Mild pain when you urinate. You may feel this pain in your lower back or lower abdomen. The pain should stop within a few minutes after you urinate. This may  last for up to 1 week.  A small amount of blood in your urine for several days.  Follow these instructions at home:  Medicines  Take over-the-counter and prescription medicines only as told by your health care provider.  If you were prescribed an antibiotic medicine, take it as told by your health care provider. Do not stop taking the antibiotic even if you start to feel better.  Do not drive for 24 hours if you were given a sedative during your procedure.  Ask your health care provider if the medicine prescribed to you requires you to avoid driving or using heavy machinery.  Activity  Rest as told by your health care provider.  Avoid sitting for a long time without moving. Get up to take short walks every 1-2 hours. This is important to improve blood flow and breathing. Ask for help if you feel weak or unsteady.  Return to your normal activities as told by your health care provider. Ask your health care provider what activities are safe for you.  General instructions    Watch for any blood in your urine. Call your health care provider if the amount of blood in your urine increases.  If you have a catheter:  Follow instructions from your health care provider about taking care of your catheter and collection bag.  Do not take baths, swim, or use a hot tub until your health care provider approves. Ask your health care provider if you may take showers. You may only be allowed to take sponge baths.  Drink enough fluid to keep your urine pale yellow.  Do not use any products that contain nicotine or tobacco, such as cigarettes, e-cigarettes, and chewing tobacco. These can delay healing after surgery. If you need help quitting, ask your health care provider.  Keep all follow-up visits as told by your health care provider. This is important.  Contact a health care provider if:  You have pain that gets worse or does not get better with medicine, especially pain when you urinate.  You have difficulty urinating.  You feel  nauseous or you vomit repeatedly during a period of more than 2 days after the procedure.  Get help right away if:  Your urine is dark red or has blood clots in it.  You are leaking urine (have incontinence).  The end of the stent comes out of your urethra.  You cannot urinate.  You have sudden, sharp, or severe pain in your abdomen or lower back.  You have a fever.  You have swelling or pain in your legs.  You have difficulty breathing.  Summary  After the procedure, it is common to have mild pain when you urinate that goes away within a few minutes after you urinate. This may last for up to 1 week.  Watch for any blood in your urine. Call your health care provider if the amount of blood in your urine increases.  Take over-the-counter and prescription medicines only as told by your health care provider.  Drink enough fluid to keep your urine pale yellow.  This information is not intended to replace advice given to you by your health care provider. Make sure you discuss any questions you have with your health care provider.  Document Released: 08/20/2014 Document Revised: 09/24/2019 Document Reviewed: 09/25/2019  [employer] Patient Education © 2020 [employer]

## 2022-08-27 NOTE — ANESTHESIA POSTPROCEDURE EVALUATION
Patient: Jose Manuel Aguirre    Procedure Summary     Date: 08/25/22 Room / Location:  OR  / SURGERY [facility]    Anesthesia Start: 1702 Anesthesia Stop: 1836    Procedures:       CYSTOSCOPY, WITH URETERAL STENT INSERTION (Right: Ureter)      LITHOTRIPSY, USING LASER (Right: Ureter) Diagnosis: (right obstructing ureteral stones)    Surgeons: Ken Conklin M.D. Responsible Provider: Abimael López M.D.    Anesthesia Type: general ASA Status: 2          Final Anesthesia Type: general  Last vitals  BP   Blood Pressure: (!) 95/52    Temp   36.6 °C (97.9 °F)    Pulse   67   Resp   18    SpO2   91 %      Anesthesia Post Evaluation    Patient location during evaluation: PACU  Patient participation: complete - patient participated  Level of consciousness: awake and alert    Airway patency: patent  Anesthetic complications: no  Cardiovascular status: hemodynamically stable  Respiratory status: acceptable  Hydration status: euvolemic    PONV: none          There were no known notable events for this encounter.     Nurse Pain Score: 0 (NPRS)

## 2022-08-30 ENCOUNTER — HOSPITAL ENCOUNTER (OUTPATIENT)
Facility: MEDICAL CENTER | Age: 57
End: 2022-08-30
Attending: PHYSICIAN ASSISTANT
Payer: COMMERCIAL

## 2022-08-30 LAB
APPEARANCE STONE: NORMAL
COMPN STONE: NORMAL
SPECIMEN WT: 188 MG

## 2022-08-30 PROCEDURE — 87086 URINE CULTURE/COLONY COUNT: CPT

## 2022-09-01 LAB
BACTERIA UR CULT: NORMAL
SIGNIFICANT IND 70042: NORMAL
SITE SITE: NORMAL
SOURCE SOURCE: NORMAL

## 2022-09-06 ASSESSMENT — ENCOUNTER SYMPTOMS
FEVER: 0
FLANK PAIN: 1
ABDOMINAL PAIN: 0
NAUSEA: 0
CHILLS: 0
VOMITING: 0
